# Patient Record
Sex: FEMALE | Race: WHITE | NOT HISPANIC OR LATINO | Employment: OTHER | URBAN - METROPOLITAN AREA
[De-identification: names, ages, dates, MRNs, and addresses within clinical notes are randomized per-mention and may not be internally consistent; named-entity substitution may affect disease eponyms.]

---

## 2018-10-24 ENCOUNTER — HOSPITAL ENCOUNTER (OUTPATIENT)
Dept: CARDIOLOGY | Facility: HOSPITAL | Age: 83
Discharge: HOME | End: 2018-10-24
Attending: INTERNAL MEDICINE | Admitting: INTERNAL MEDICINE

## 2019-10-24 RX ORDER — FUROSEMIDE 20 MG/1
TABLET ORAL
Qty: 130 TABLET | Refills: 2 | Status: SHIPPED | OUTPATIENT
Start: 2019-10-24 | End: 2020-09-10 | Stop reason: SDUPTHER

## 2019-12-18 RX ORDER — LOSARTAN POTASSIUM 100 MG/1
100 TABLET ORAL DAILY
Qty: 90 TABLET | Refills: 3 | Status: SHIPPED | OUTPATIENT
Start: 2019-12-18 | End: 2021-07-12

## 2019-12-18 RX ORDER — LOSARTAN POTASSIUM 100 MG/1
TABLET ORAL EVERY 24 HOURS
COMMUNITY
Start: 2018-10-18 | End: 2019-12-18 | Stop reason: SDUPTHER

## 2020-06-02 ENCOUNTER — OUTSIDE FACILITY SERVICE (OUTPATIENT)
Dept: CARDIOLOGY | Facility: CLINIC | Age: 85
End: 2020-06-02

## 2020-06-02 PROCEDURE — 93010 ELECTROCARDIOGRAM REPORT: CPT | Performed by: INTERNAL MEDICINE

## 2020-06-02 PROCEDURE — 99213 OFFICE O/P EST LOW 20 MIN: CPT | Performed by: INTERNAL MEDICINE

## 2020-09-10 RX ORDER — AMLODIPINE BESYLATE 5 MG/1
5 TABLET ORAL DAILY
COMMUNITY
Start: 2020-08-28 | End: 2021-07-12

## 2020-09-10 RX ORDER — PHENOL 1.4 %
600 AEROSOL, SPRAY (ML) MUCOUS MEMBRANE
COMMUNITY
Start: 2015-08-30

## 2020-09-10 RX ORDER — METOPROLOL TARTRATE 50 MG/1
75 TABLET, FILM COATED ORAL 2 TIMES DAILY
COMMUNITY
Start: 2020-06-17 | End: 2021-07-12

## 2020-09-10 RX ORDER — POTASSIUM CHLORIDE 750 MG/1
1 CAPSULE, EXTENDED RELEASE ORAL DAILY
COMMUNITY
Start: 2015-12-01 | End: 2021-07-27 | Stop reason: SDUPTHER

## 2020-09-10 RX ORDER — DILTIAZEM HYDROCHLORIDE 120 MG/1
120 CAPSULE, COATED, EXTENDED RELEASE ORAL DAILY
COMMUNITY
Start: 2020-07-06 | End: 2021-07-12

## 2020-09-10 RX ORDER — AMLODIPINE BESYLATE AND BENAZEPRIL HYDROCHLORIDE 5; 20 MG/1; MG/1
CAPSULE ORAL DAILY
COMMUNITY
Start: 2015-08-30 | End: 2020-09-10

## 2020-09-10 RX ORDER — ATORVASTATIN CALCIUM 10 MG/1
TABLET, FILM COATED ORAL
COMMUNITY
Start: 2015-12-01 | End: 2022-01-12 | Stop reason: SDUPTHER

## 2020-09-10 RX ORDER — METOLAZONE 2.5 MG/1
TABLET ORAL
COMMUNITY
Start: 2020-09-03 | End: 2021-07-12 | Stop reason: SDUPTHER

## 2020-09-10 RX ORDER — FUROSEMIDE 20 MG/1
TABLET ORAL
Qty: 130 TABLET | Refills: 0 | Status: SHIPPED | OUTPATIENT
Start: 2020-09-10 | End: 2021-10-07 | Stop reason: SDUPTHER

## 2020-09-10 NOTE — PROGRESS NOTES
Hoang Brother requesting refills on Lasix 20mg. RX sent and med list reconciled with fill history and [facility] med list from 6/2/2020

## 2021-07-12 ENCOUNTER — OFFICE VISIT (OUTPATIENT)
Dept: FAMILY MEDICINE CLINIC | Facility: CLINIC | Age: 86
End: 2021-07-12

## 2021-07-12 VITALS
OXYGEN SATURATION: 98 % | TEMPERATURE: 97.1 F | BODY MASS INDEX: 27.79 KG/M2 | WEIGHT: 151 LBS | SYSTOLIC BLOOD PRESSURE: 131 MMHG | DIASTOLIC BLOOD PRESSURE: 76 MMHG | HEIGHT: 62 IN | HEART RATE: 74 BPM

## 2021-07-12 DIAGNOSIS — I10 ESSENTIAL HYPERTENSION: Primary | ICD-10-CM

## 2021-07-12 DIAGNOSIS — E78.5 HYPERLIPIDEMIA, UNSPECIFIED HYPERLIPIDEMIA TYPE: ICD-10-CM

## 2021-07-12 DIAGNOSIS — D48.5 NEOPLASM OF UNCERTAIN BEHAVIOR OF SKIN: ICD-10-CM

## 2021-07-12 PROBLEM — I48.91 A-FIB (HCC): Status: ACTIVE | Noted: 2020-06-02

## 2021-07-12 PROBLEM — Z82.3 FAMILY HISTORY OF STROKE: Status: ACTIVE | Noted: 2021-07-12

## 2021-07-12 PROBLEM — Z83.3 FAMILY HISTORY OF DIABETES MELLITUS: Status: ACTIVE | Noted: 2021-07-12

## 2021-07-12 PROCEDURE — 99204 OFFICE O/P NEW MOD 45 MIN: CPT | Performed by: FAMILY MEDICINE

## 2021-07-12 RX ORDER — DILTIAZEM HYDROCHLORIDE 180 MG/1
180 CAPSULE, COATED, EXTENDED RELEASE ORAL DAILY
COMMUNITY
Start: 2021-05-28 | End: 2023-01-10 | Stop reason: SDUPTHER

## 2021-07-12 RX ORDER — METOLAZONE 2.5 MG/1
TABLET ORAL
Qty: 36 TABLET | Refills: 1 | Status: SHIPPED | OUTPATIENT
Start: 2021-07-12 | End: 2022-01-12 | Stop reason: SDUPTHER

## 2021-07-12 RX ORDER — LOSARTAN POTASSIUM 25 MG/1
25 TABLET ORAL DAILY
COMMUNITY
Start: 2021-05-28 | End: 2023-01-10 | Stop reason: SDUPTHER

## 2021-07-12 NOTE — PROGRESS NOTES
"Chief Complaint  Establish Care (using a new pharmacy as of today. Please send in new rx's for 90 days), Hypertension, and Hyperlipidemia    Subjective          Ro Mejia presents to [facility]  Hypertension  This is a chronic problem. The current episode started more than 1 year ago. The problem is unchanged. The problem is controlled. Pertinent negatives include no anxiety, chest pain, headaches, malaise/fatigue, palpitations, peripheral edema or shortness of breath. There are no associated agents to hypertension. Current antihypertension treatment includes calcium channel blockers, angiotensin blockers and diuretics. The current treatment provides moderate improvement. There are no compliance problems.    Hyperlipidemia  This is a chronic problem. The current episode started more than 1 year ago. The problem is controlled. There are no known factors aggravating her hyperlipidemia. Pertinent negatives include no chest pain or shortness of breath. Current antihyperlipidemic treatment includes statins and diet change. There are no compliance problems.        Objective   Vital Signs:   /76 (BP Location: Right arm, Patient Position: Sitting, Cuff Size: Adult)   Pulse 74   Temp 97.1 °F (36.2 °C)   Ht 156.8 cm (61.75\")   Wt 68.5 kg (151 lb)   SpO2 98%   BMI 27.84 kg/m²     Physical Exam  Constitutional:       General: She is not in acute distress.     Appearance: She is well-developed.   HENT:      Head: Normocephalic.   Eyes:      General: Lids are normal.      Conjunctiva/sclera: Conjunctivae normal.   Neck:      Thyroid: No thyroid mass or thyromegaly.      Trachea: Trachea normal.   Cardiovascular:      Rate and Rhythm: Normal rate and regular rhythm.      Heart sounds: Normal heart sounds.   Pulmonary:      Effort: Pulmonary effort is normal.      Breath sounds: Normal breath sounds.   Abdominal:      Palpations: Abdomen is soft.   Musculoskeletal:      Cervical " back: Normal range of motion.   Lymphadenopathy:      Cervical: No cervical adenopathy.   Skin:     General: Skin is warm and dry.   Neurological:      Mental Status: She is alert and oriented to person, place, and time.   Psychiatric:         Attention and Perception: She is attentive.         Mood and Affect: Mood normal.         Speech: Speech normal.         Behavior: Behavior normal.        Result Review :   The following data was reviewed by: Bianca Reid MD on 07/12/2021:                                Assessment and Plan    Diagnoses and all orders for this visit:    1. Essential hypertension (Primary)  -     metOLazone (ZAROXOLYN) 2.5 MG tablet; Take one on M, W, F  Dispense: 36 tablet; Refill: 1    2. Hyperlipidemia, unspecified hyperlipidemia type    3. Neoplasm of uncertain behavior of skin  -     Ambulatory Referral to Dermatology        Follow Up   Return in about 6 months (around 1/12/2022).  Patient was given instructions and counseling regarding her condition or for health maintenance advice. Please see specific information pulled into the AVS if appropriate.

## 2021-07-28 RX ORDER — POTASSIUM CHLORIDE 750 MG/1
10 CAPSULE, EXTENDED RELEASE ORAL DAILY
Qty: 90 CAPSULE | Refills: 1
Start: 2021-07-28 | End: 2021-10-07 | Stop reason: SDUPTHER

## 2021-09-27 ENCOUNTER — OFFICE VISIT (OUTPATIENT)
Dept: FAMILY MEDICINE CLINIC | Facility: CLINIC | Age: 86
End: 2021-09-27

## 2021-09-27 VITALS
TEMPERATURE: 96.9 F | DIASTOLIC BLOOD PRESSURE: 72 MMHG | OXYGEN SATURATION: 98 % | RESPIRATION RATE: 18 BRPM | HEIGHT: 62 IN | WEIGHT: 148 LBS | HEART RATE: 81 BPM | BODY MASS INDEX: 27.23 KG/M2 | SYSTOLIC BLOOD PRESSURE: 131 MMHG

## 2021-09-27 DIAGNOSIS — H61.21 IMPACTED CERUMEN OF RIGHT EAR: Primary | ICD-10-CM

## 2021-09-27 PROCEDURE — 99212 OFFICE O/P EST SF 10 MIN: CPT | Performed by: FAMILY MEDICINE

## 2021-09-27 PROCEDURE — 69209 REMOVE IMPACTED EAR WAX UNI: CPT | Performed by: FAMILY MEDICINE

## 2021-09-27 NOTE — PROGRESS NOTES
"Chief Complaint  Cerumen Impaction (R)    Subjective          Ro Mejia presents to [facility]  She went to have a hearing aid assessment and was told that she had a large amount of wax in her right ear.  Left ear is clear.         Objective   Vital Signs:   /72   Pulse 81   Temp 96.9 °F (36.1 °C)   Resp 18   Ht 156.8 cm (61.75\")   Wt 67.1 kg (148 lb)   SpO2 98%   BMI 27.29 kg/m²     Physical Exam  HENT:      Right Ear: There is impacted cerumen.        Result Review :          Ear Cerumen Removal    Date/Time: 9/27/2021 10:36 AM  Performed by: Bianca Reid MD  Authorized by: Bianca Reid MD     Anesthesia:  Local Anesthetic: none  Ceruminolytics applied: Ceruminolytics applied prior to the procedure.  Location details: right ear  Patient tolerance: patient tolerated the procedure well with no immediate complications  Procedure type: irrigation   Sedation:  Patient sedated: no              Assessment and Plan    Diagnoses and all orders for this visit:    1. Impacted cerumen of right ear (Primary)  -     Ear Cerumen Removal        Follow Up   No follow-ups on file.  Patient was given instructions and counseling regarding her condition or for health maintenance advice. Please see specific information pulled into the AVS if appropriate.       "

## 2021-10-07 NOTE — TELEPHONE ENCOUNTER
Caller: LESLIE DOUGLAS    Relationship: Emergency Contact      Medication requested (name and dosage):  furosemide (LASIX) 20 MG tablet  potassium chloride (MICRO-K) 10 MEQ CR capsule    Pharmacy where request should be sent: [facility] #[id number] [place], [place] - [address] [place] [address] RD AT SEC OF [place] [address] & [facility] [address] - [phone]  - [phone]  [phone]    Additional details provided by patient: PATIENT IS COMPLETELY OUT FUROSEMIDE AND WILL BE OUT OF POTASSIUM SOON AND ZERO REFILLS    Best call back number: [phone]    Does the patient have less than a 3 day supply:  [x] Yes  [] No    Neil Andrews Rep   10/07/21 13:48 EDT

## 2021-10-08 RX ORDER — POTASSIUM CHLORIDE 750 MG/1
10 CAPSULE, EXTENDED RELEASE ORAL DAILY
Qty: 90 CAPSULE | Refills: 1
Start: 2021-10-08 | End: 2021-10-11 | Stop reason: SDUPTHER

## 2021-10-08 RX ORDER — FUROSEMIDE 20 MG/1
TABLET ORAL
Qty: 130 TABLET | Refills: 0 | Status: SHIPPED | OUTPATIENT
Start: 2021-10-08 | End: 2022-01-12 | Stop reason: SDUPTHER

## 2021-10-11 RX ORDER — POTASSIUM CHLORIDE 750 MG/1
10 CAPSULE, EXTENDED RELEASE ORAL DAILY
Qty: 90 CAPSULE | Refills: 1
Start: 2021-10-11 | End: 2021-10-25 | Stop reason: SDUPTHER

## 2021-10-11 NOTE — TELEPHONE ENCOUNTER
Caller: LESLIE DOUGLAS    Relationship: Emergency Contact      Medication requested (name and dosage): potassium chloride (MICRO-K) 10 MEQ CR capsule    Pharmacy where request should be sent: [facility] #[id number] [place] [id number] [facility] AT [facility] OF [place] [address] & [facility] LINE  - [phone]  - [phone]      Additional details provided by patient: PATIENT IS OUT OF MEDICATION    Best call back number: [phone]  Does the patient have less than a 3 day supply:  [x] Yes  [] No    Neil Amaya Rep   10/11/21 11:14 EDT

## 2021-10-25 RX ORDER — POTASSIUM CHLORIDE 750 MG/1
10 CAPSULE, EXTENDED RELEASE ORAL DAILY
Qty: 90 CAPSULE | Refills: 1
Start: 2021-10-25 | End: 2021-10-28 | Stop reason: SDUPTHER

## 2021-10-25 NOTE — TELEPHONE ENCOUNTER
Caller: LESLIE DOUGLAS    Relationship: Emergency Contact      Medication requested (name and dosage):     Requested Prescriptions:   Requested Prescriptions     Pending Prescriptions Disp Refills   • potassium chloride (MICRO-K) 10 MEQ CR capsule 90 capsule 1     Sig: Take 1 capsule by mouth Daily.        Pharmacy where request should be sent: [facility] #[id number] [place], [place] - [address] AT SEC OF [place] [address] & [facility] LINE  - [phone]  - [phone]   [phone]    Additional details provided by patient: PATIENT IS OUT OF MEDICATION PHARMACY NEVER RECEIVED PRESCRIPTION    Best call back number: [phone]     Does the patient have less than a 3 day supply:  [x] Yes  [] No    Neil Michele Rep   10/25/21 11:08 EDT

## 2021-10-28 RX ORDER — POTASSIUM CHLORIDE 750 MG/1
10 CAPSULE, EXTENDED RELEASE ORAL DAILY
Qty: 90 CAPSULE | Refills: 1 | Status: SHIPPED | OUTPATIENT
Start: 2021-10-28 | End: 2022-01-12 | Stop reason: SDUPTHER

## 2021-10-28 NOTE — TELEPHONE ENCOUNTER
Caller: LESLIE DOUGLAS    Relationship: Emergency Contact    Medication requested (name and dosage):     Requested Prescriptions:   Requested Prescriptions     Pending Prescriptions Disp Refills   • potassium chloride (MICRO-K) 10 MEQ CR capsule 90 capsule 1     Sig: Take 1 capsule by mouth Daily.        Pharmacy where request should be sent: [facility] #[id number] [place], [place] - [address] [facility] AT [facility] OF [place] [address] & [facility] LINE  - [phone]  - [phone]   [phone]    Additional details provided by patient: PATIENT IS OUT AND HAS PHARMACY SAYS THEY HAVE NOT RECEIVED REQUEST    Best call back number: [phone]    Does the patient have less than a 3 day supply:  [x] Yes  [] No    Neil Sears Rep   10/28/21 11:32 EDT

## 2022-01-12 ENCOUNTER — OFFICE VISIT (OUTPATIENT)
Dept: FAMILY MEDICINE CLINIC | Facility: CLINIC | Age: 87
End: 2022-01-12

## 2022-01-12 VITALS
DIASTOLIC BLOOD PRESSURE: 76 MMHG | SYSTOLIC BLOOD PRESSURE: 118 MMHG | WEIGHT: 149 LBS | BODY MASS INDEX: 27.47 KG/M2 | TEMPERATURE: 97.7 F | OXYGEN SATURATION: 98 % | HEART RATE: 78 BPM

## 2022-01-12 DIAGNOSIS — I10 ESSENTIAL HYPERTENSION: Primary | ICD-10-CM

## 2022-01-12 DIAGNOSIS — K64.9 HEMORRHOIDS, UNSPECIFIED HEMORRHOID TYPE: ICD-10-CM

## 2022-01-12 DIAGNOSIS — E78.5 HYPERLIPIDEMIA, UNSPECIFIED HYPERLIPIDEMIA TYPE: ICD-10-CM

## 2022-01-12 PROCEDURE — 99214 OFFICE O/P EST MOD 30 MIN: CPT | Performed by: FAMILY MEDICINE

## 2022-01-12 RX ORDER — ATORVASTATIN CALCIUM 10 MG/1
10 TABLET, FILM COATED ORAL NIGHTLY
Qty: 90 TABLET | Refills: 1 | Status: SHIPPED | OUTPATIENT
Start: 2022-01-12 | End: 2022-07-08

## 2022-01-12 RX ORDER — DILTIAZEM HYDROCHLORIDE 180 MG/1
180 CAPSULE, EXTENDED RELEASE ORAL DAILY
COMMUNITY
Start: 2021-10-23 | End: 2023-01-10

## 2022-01-12 RX ORDER — HYDROCORTISONE ACETATE 25 MG/1
25 SUPPOSITORY RECTAL 2 TIMES DAILY
Qty: 10 SUPPOSITORY | Refills: 0 | Status: SHIPPED | OUTPATIENT
Start: 2022-01-12

## 2022-01-12 RX ORDER — METOLAZONE 2.5 MG/1
TABLET ORAL
Qty: 36 TABLET | Refills: 1 | Status: SHIPPED | OUTPATIENT
Start: 2022-01-12 | End: 2022-07-12 | Stop reason: SDUPTHER

## 2022-01-12 RX ORDER — POTASSIUM CHLORIDE 750 MG/1
10 CAPSULE, EXTENDED RELEASE ORAL DAILY
Qty: 90 CAPSULE | Refills: 1 | Status: SHIPPED | OUTPATIENT
Start: 2022-01-12 | End: 2022-07-12 | Stop reason: SDUPTHER

## 2022-01-12 RX ORDER — FUROSEMIDE 20 MG/1
TABLET ORAL
Qty: 48 TABLET | Refills: 1 | Status: SHIPPED | OUTPATIENT
Start: 2022-01-12 | End: 2023-01-10 | Stop reason: SDUPTHER

## 2022-01-12 NOTE — PROGRESS NOTES
Chief Complaint  Follow-up (6 month check up. non fasting )    Subjective          Ro Mejia presents to [facility]  Hemorrhoids  This is a new problem. The current episode started 1 to 4 weeks ago. The problem occurs constantly. The problem has been waxing and waning. Associated symptoms include myalgias. Pertinent negatives include no chest pain, chills, congestion, coughing, fever, swollen glands or weakness. Nothing aggravates the symptoms. She has tried nothing for the symptoms.   Hypertension  This is a chronic problem. The current episode started more than 1 year ago. The problem is unchanged. The problem is controlled. Pertinent negatives include no anxiety, chest pain, peripheral edema or shortness of breath. There are no associated agents to hypertension. Current antihypertension treatment includes angiotensin blockers and calcium channel blockers. The current treatment provides moderate improvement. There are no compliance problems.  There is no history of chronic renal disease.   Hyperlipidemia  This is a chronic problem. The current episode started more than 1 year ago. She has no history of chronic renal disease or hypothyroidism. There are no known factors aggravating her hyperlipidemia. Associated symptoms include myalgias. Pertinent negatives include no chest pain or shortness of breath. Current antihyperlipidemic treatment includes statins. The current treatment provides moderate improvement of lipids. There are no compliance problems.        Objective   Vital Signs:   /76 (BP Location: Left arm, Patient Position: Sitting, Cuff Size: Adult)   Pulse 78   Temp 97.7 °F (36.5 °C) (Infrared)   Wt 67.6 kg (149 lb)   SpO2 98%   BMI 27.47 kg/m²     Physical Exam  Vitals and nursing note reviewed. Exam conducted with a chaperone present.   Constitutional:       General: She is not in acute distress.     Appearance: She is well-developed.   HENT:      Head:  Normocephalic.   Eyes:      General: Lids are normal.      Conjunctiva/sclera: Conjunctivae normal.   Neck:      Thyroid: No thyroid mass or thyromegaly.      Trachea: Trachea normal.   Cardiovascular:      Rate and Rhythm: Normal rate and regular rhythm.      Heart sounds: Normal heart sounds.   Pulmonary:      Effort: Pulmonary effort is normal.      Breath sounds: Normal breath sounds.   Abdominal:      Palpations: Abdomen is soft.   Musculoskeletal:      Cervical back: Normal range of motion.   Lymphadenopathy:      Cervical: No cervical adenopathy.   Skin:     General: Skin is warm and dry.   Neurological:      Mental Status: She is alert and oriented to person, place, and time.   Psychiatric:         Attention and Perception: She is attentive.         Mood and Affect: Mood normal.         Speech: Speech normal.         Behavior: Behavior normal.        Result Review :   The following data was reviewed by: Bianca Reid MD on 01/12/2022:                Assessment and Plan    Diagnoses and all orders for this visit:    1. Essential hypertension (Primary)  -     metOLazone (ZAROXOLYN) 2.5 MG tablet; Take one on M, W, F  Dispense: 36 tablet; Refill: 1    2. Hyperlipidemia, unspecified hyperlipidemia type  -     Comprehensive Metabolic Panel  -     Lipid Panel    3. Hemorrhoids, unspecified hemorrhoid type  -     CBC & Differential    Other orders  -     hydrocortisone (ANUSOL-HC) 25 MG suppository; Insert 1 suppository into the rectum 2 (Two) Times a Day.  Dispense: 10 suppository; Refill: 0  -     rivaroxaban (XARELTO) 15 MG tablet; Take 1 tablet by mouth Daily With Dinner.  Dispense: 30 tablet; Refill: 5  -     potassium chloride (MICRO-K) 10 MEQ CR capsule; Take 1 capsule by mouth Daily.  Dispense: 90 capsule; Refill: 1  -     furosemide (LASIX) 20 MG tablet; 1 tablet 4 days a week  Dispense: 48 tablet; Refill: 1  -     atorvastatin (LIPITOR) 10 MG tablet; Take 1 tablet by mouth Every Night.  Dispense: 90  tablet; Refill: 1        Follow Up   No follow-ups on file.  Patient was given instructions and counseling regarding her condition or for health maintenance advice. Please see specific information pulled into the AVS if appropriate.

## 2022-06-22 ENCOUNTER — OFFICE VISIT (OUTPATIENT)
Dept: FAMILY MEDICINE CLINIC | Facility: CLINIC | Age: 87
End: 2022-06-22

## 2022-06-22 VITALS
WEIGHT: 146 LBS | BODY MASS INDEX: 26.7 KG/M2 | OXYGEN SATURATION: 96 % | SYSTOLIC BLOOD PRESSURE: 131 MMHG | HEART RATE: 81 BPM | DIASTOLIC BLOOD PRESSURE: 78 MMHG | TEMPERATURE: 98.4 F

## 2022-06-22 DIAGNOSIS — J40 BRONCHITIS: Primary | ICD-10-CM

## 2022-06-22 PROCEDURE — 99213 OFFICE O/P EST LOW 20 MIN: CPT | Performed by: FAMILY MEDICINE

## 2022-06-22 NOTE — ASSESSMENT & PLAN NOTE
Feeling much better.  She has finished her steroids and has a few days left of doxycycline.  Finish antibiotics as prescribed.  Call back if any symptoms return.

## 2022-06-22 NOTE — PROGRESS NOTES
"Chief Complaint  Follow-up (Bronchitis, pt stated she is feeling better she has a bad cough early in the mornings and at night other then that she feels much better )    Subjective        Ro Mejia presents to [facility]  Cough  This is a new problem. The current episode started in the past 7 days. The problem has been gradually improving. The cough is non-productive. Pertinent negatives include no chest pain, chills, ear congestion, ear pain, fever, headaches, nasal congestion or sore throat. The symptoms are aggravated by exercise. She has tried oral steroids, a beta-agonist inhaler and prescription cough suppressant (doxycycline) for the symptoms. The treatment provided moderate relief. There is no history of asthma or emphysema.       Objective   Vital Signs:  /78 (BP Location: Left arm, Patient Position: Sitting, Cuff Size: Adult)   Pulse 81   Temp 98.4 °F (36.9 °C) (Infrared)   Wt 66.2 kg (146 lb)   SpO2 96%   BMI 26.70 kg/m²   Estimated body mass index is 26.7 kg/m² as calculated from the following:    Height as of 6/16/22: 157.5 cm (62\").    Weight as of this encounter: 66.2 kg (146 lb).          Physical Exam  Vitals and nursing note reviewed.   Constitutional:       General: She is not in acute distress.     Appearance: She is well-developed.   HENT:      Head: Normocephalic.   Eyes:      General: Lids are normal.      Conjunctiva/sclera: Conjunctivae normal.   Neck:      Thyroid: No thyroid mass or thyromegaly.      Trachea: Trachea normal.   Cardiovascular:      Rate and Rhythm: Normal rate and regular rhythm.      Heart sounds: Normal heart sounds.   Pulmonary:      Effort: Pulmonary effort is normal.      Breath sounds: Normal breath sounds. No wheezing or rhonchi.   Musculoskeletal:      Cervical back: Normal range of motion.   Lymphadenopathy:      Cervical: No cervical adenopathy.   Skin:     General: Skin is warm and dry.   Neurological:      " Mental Status: She is alert and oriented to person, place, and time.   Psychiatric:         Attention and Perception: She is attentive.         Mood and Affect: Mood normal.         Speech: Speech normal.         Behavior: Behavior normal.        Result Review :  The following data was reviewed by: Bianca Reid MD on 06/22/2022:      Data reviewed: Radiologic studies chest x-ray from the [facility]          Assessment and Plan   Diagnoses and all orders for this visit:    1. Bronchitis (Primary)  Assessment & Plan:  Feeling much better.  She has finished her steroids and has a few days left of doxycycline.  Finish antibiotics as prescribed.  Call back if any symptoms return.             Follow Up   No follow-ups on file.  Patient was given instructions and counseling regarding her condition or for health maintenance advice. Please see specific information pulled into the AVS if appropriate.

## 2022-07-08 RX ORDER — ATORVASTATIN CALCIUM 10 MG/1
10 TABLET, FILM COATED ORAL NIGHTLY
Qty: 90 TABLET | Refills: 1 | Status: SHIPPED | OUTPATIENT
Start: 2022-07-08 | End: 2023-01-04

## 2022-07-08 RX ORDER — RIVAROXABAN 15 MG/1
TABLET, FILM COATED ORAL
Qty: 30 TABLET | Refills: 5 | Status: SHIPPED | OUTPATIENT
Start: 2022-07-08 | End: 2023-01-04

## 2022-07-12 ENCOUNTER — OFFICE VISIT (OUTPATIENT)
Dept: FAMILY MEDICINE CLINIC | Facility: CLINIC | Age: 87
End: 2022-07-12

## 2022-07-12 ENCOUNTER — LAB (OUTPATIENT)
Dept: FAMILY MEDICINE CLINIC | Facility: CLINIC | Age: 87
End: 2022-07-12

## 2022-07-12 VITALS
OXYGEN SATURATION: 97 % | HEIGHT: 62 IN | RESPIRATION RATE: 16 BRPM | WEIGHT: 147.4 LBS | SYSTOLIC BLOOD PRESSURE: 125 MMHG | TEMPERATURE: 97.8 F | BODY MASS INDEX: 27.12 KG/M2 | HEART RATE: 86 BPM | DIASTOLIC BLOOD PRESSURE: 73 MMHG

## 2022-07-12 DIAGNOSIS — I10 ESSENTIAL HYPERTENSION: ICD-10-CM

## 2022-07-12 DIAGNOSIS — E78.5 HYPERLIPIDEMIA, UNSPECIFIED HYPERLIPIDEMIA TYPE: ICD-10-CM

## 2022-07-12 DIAGNOSIS — Z00.00 MEDICARE ANNUAL WELLNESS VISIT, SUBSEQUENT: Primary | ICD-10-CM

## 2022-07-12 LAB
ALBUMIN SERPL-MCNC: 4.2 G/DL (ref 3.5–5.2)
ALBUMIN/GLOB SERPL: 1.9 G/DL
ALP SERPL-CCNC: 60 U/L (ref 39–117)
ALT SERPL W P-5'-P-CCNC: 12 U/L (ref 1–33)
ANION GAP SERPL CALCULATED.3IONS-SCNC: 11.5 MMOL/L (ref 5–15)
AST SERPL-CCNC: 18 U/L (ref 1–32)
BASOPHILS # BLD AUTO: 0.03 10*3/MM3 (ref 0–0.2)
BASOPHILS NFR BLD AUTO: 0.5 % (ref 0–1.5)
BILIRUB SERPL-MCNC: 0.5 MG/DL (ref 0–1.2)
BUN SERPL-MCNC: 22 MG/DL (ref 8–23)
BUN/CREAT SERPL: 16.1 (ref 7–25)
CALCIUM SPEC-SCNC: 9.6 MG/DL (ref 8.2–9.6)
CHLORIDE SERPL-SCNC: 102 MMOL/L (ref 98–107)
CHOLEST SERPL-MCNC: 132 MG/DL (ref 0–200)
CO2 SERPL-SCNC: 27.5 MMOL/L (ref 22–29)
CREAT SERPL-MCNC: 1.37 MG/DL (ref 0.57–1)
DEPRECATED RDW RBC AUTO: 42.1 FL (ref 37–54)
EGFRCR SERPLBLD CKD-EPI 2021: 36.5 ML/MIN/1.73
EOSINOPHIL # BLD AUTO: 0.08 10*3/MM3 (ref 0–0.4)
EOSINOPHIL NFR BLD AUTO: 1.3 % (ref 0.3–6.2)
ERYTHROCYTE [DISTWIDTH] IN BLOOD BY AUTOMATED COUNT: 12.9 % (ref 12.3–15.4)
GLOBULIN UR ELPH-MCNC: 2.2 GM/DL
GLUCOSE SERPL-MCNC: 93 MG/DL (ref 65–99)
HCT VFR BLD AUTO: 38.7 % (ref 34–46.6)
HDLC SERPL-MCNC: 68 MG/DL (ref 40–60)
HGB BLD-MCNC: 12.5 G/DL (ref 12–15.9)
IMM GRANULOCYTES # BLD AUTO: 0.02 10*3/MM3 (ref 0–0.05)
IMM GRANULOCYTES NFR BLD AUTO: 0.3 % (ref 0–0.5)
LDLC SERPL CALC-MCNC: 54 MG/DL (ref 0–100)
LDLC/HDLC SERPL: 0.81 {RATIO}
LYMPHOCYTES # BLD AUTO: 1.57 10*3/MM3 (ref 0.7–3.1)
LYMPHOCYTES NFR BLD AUTO: 26.1 % (ref 19.6–45.3)
MCH RBC QN AUTO: 29.7 PG (ref 26.6–33)
MCHC RBC AUTO-ENTMCNC: 32.3 G/DL (ref 31.5–35.7)
MCV RBC AUTO: 91.9 FL (ref 79–97)
MONOCYTES # BLD AUTO: 0.53 10*3/MM3 (ref 0.1–0.9)
MONOCYTES NFR BLD AUTO: 8.8 % (ref 5–12)
NEUTROPHILS NFR BLD AUTO: 3.79 10*3/MM3 (ref 1.7–7)
NEUTROPHILS NFR BLD AUTO: 63 % (ref 42.7–76)
NRBC BLD AUTO-RTO: 0 /100 WBC (ref 0–0.2)
PLATELET # BLD AUTO: 247 10*3/MM3 (ref 140–450)
PMV BLD AUTO: 11.4 FL (ref 6–12)
POTASSIUM SERPL-SCNC: 3.9 MMOL/L (ref 3.5–5.2)
PROT SERPL-MCNC: 6.4 G/DL (ref 6–8.5)
RBC # BLD AUTO: 4.21 10*6/MM3 (ref 3.77–5.28)
SODIUM SERPL-SCNC: 141 MMOL/L (ref 136–145)
TRIGL SERPL-MCNC: 43 MG/DL (ref 0–150)
VLDLC SERPL-MCNC: 10 MG/DL (ref 5–40)
WBC NRBC COR # BLD: 6.02 10*3/MM3 (ref 3.4–10.8)

## 2022-07-12 PROCEDURE — 36415 COLL VENOUS BLD VENIPUNCTURE: CPT | Performed by: FAMILY MEDICINE

## 2022-07-12 PROCEDURE — G0439 PPPS, SUBSEQ VISIT: HCPCS | Performed by: FAMILY MEDICINE

## 2022-07-12 PROCEDURE — 80053 COMPREHEN METABOLIC PANEL: CPT | Performed by: FAMILY MEDICINE

## 2022-07-12 PROCEDURE — 85025 COMPLETE CBC W/AUTO DIFF WBC: CPT | Performed by: FAMILY MEDICINE

## 2022-07-12 PROCEDURE — 80061 LIPID PANEL: CPT | Performed by: FAMILY MEDICINE

## 2022-07-12 PROCEDURE — 1159F MED LIST DOCD IN RCRD: CPT | Performed by: FAMILY MEDICINE

## 2022-07-12 RX ORDER — METOLAZONE 2.5 MG/1
TABLET ORAL
Qty: 36 TABLET | Refills: 3 | Status: SHIPPED | OUTPATIENT
Start: 2022-07-12 | End: 2023-01-10 | Stop reason: SDUPTHER

## 2022-07-12 RX ORDER — POTASSIUM CHLORIDE 750 MG/1
10 CAPSULE, EXTENDED RELEASE ORAL DAILY
Qty: 90 CAPSULE | Refills: 3 | Status: SHIPPED | OUTPATIENT
Start: 2022-07-12 | End: 2023-01-10 | Stop reason: SDUPTHER

## 2022-07-21 ENCOUNTER — TELEPHONE (OUTPATIENT)
Dept: FAMILY MEDICINE CLINIC | Facility: CLINIC | Age: 87
End: 2022-07-21

## 2022-07-21 NOTE — TELEPHONE ENCOUNTER
Please see the note on her labs.  It looks like Karen recorded that she gave the results to Phyllis on 7/13/22.  Please call her again with the test results.

## 2022-07-21 NOTE — TELEPHONE ENCOUNTER
No answer. Left a detailed vm with these results and asked cata to please return my call to confirm she received this information

## 2022-07-21 NOTE — TELEPHONE ENCOUNTER
Caller: DOUGLAS LESLIE    Relationship: Emergency Contact    Best call back number: [phone]    Caller requesting test results: PATIENTS DAUGHTER IN LAW    What test was performed: LABS    When was the test performed: 7-12-22    Where was the test performed: OFFICE    Additional notes: NEEDING LAB RESULTS, STATES THEY HAVE NEVER GOTTEN A CALL.

## 2023-01-04 RX ORDER — ATORVASTATIN CALCIUM 10 MG/1
10 TABLET, FILM COATED ORAL NIGHTLY
Qty: 90 TABLET | Refills: 1 | Status: SHIPPED | OUTPATIENT
Start: 2023-01-04 | End: 2023-01-10 | Stop reason: SDUPTHER

## 2023-01-04 RX ORDER — RIVAROXABAN 15 MG/1
TABLET, FILM COATED ORAL
Qty: 30 TABLET | Refills: 5 | Status: SHIPPED | OUTPATIENT
Start: 2023-01-04

## 2023-01-10 ENCOUNTER — OFFICE VISIT (OUTPATIENT)
Dept: FAMILY MEDICINE CLINIC | Facility: CLINIC | Age: 88
End: 2023-01-10
Payer: MEDICARE

## 2023-01-10 ENCOUNTER — LAB (OUTPATIENT)
Dept: FAMILY MEDICINE CLINIC | Facility: CLINIC | Age: 88
End: 2023-01-10
Payer: MEDICARE

## 2023-01-10 VITALS
OXYGEN SATURATION: 96 % | SYSTOLIC BLOOD PRESSURE: 139 MMHG | DIASTOLIC BLOOD PRESSURE: 83 MMHG | WEIGHT: 147 LBS | HEART RATE: 60 BPM | HEIGHT: 62 IN | BODY MASS INDEX: 27.05 KG/M2 | TEMPERATURE: 97.1 F

## 2023-01-10 DIAGNOSIS — I10 ESSENTIAL HYPERTENSION: Primary | ICD-10-CM

## 2023-01-10 DIAGNOSIS — H61.21 IMPACTED CERUMEN OF RIGHT EAR: ICD-10-CM

## 2023-01-10 DIAGNOSIS — E78.5 HYPERLIPIDEMIA, UNSPECIFIED HYPERLIPIDEMIA TYPE: ICD-10-CM

## 2023-01-10 LAB
ALBUMIN SERPL-MCNC: 4.4 G/DL (ref 3.5–5.2)
ALBUMIN/GLOB SERPL: 1.7 G/DL
ALP SERPL-CCNC: 82 U/L (ref 39–117)
ALT SERPL W P-5'-P-CCNC: 9 U/L (ref 1–33)
ANION GAP SERPL CALCULATED.3IONS-SCNC: 9 MMOL/L (ref 5–15)
AST SERPL-CCNC: 18 U/L (ref 1–32)
BILIRUB SERPL-MCNC: 0.7 MG/DL (ref 0–1.2)
BUN SERPL-MCNC: 21 MG/DL (ref 8–23)
BUN/CREAT SERPL: 15.1 (ref 7–25)
CALCIUM SPEC-SCNC: 10.1 MG/DL (ref 8.2–9.6)
CHLORIDE SERPL-SCNC: 99 MMOL/L (ref 98–107)
CHOLEST SERPL-MCNC: 135 MG/DL (ref 0–200)
CO2 SERPL-SCNC: 31 MMOL/L (ref 22–29)
CREAT SERPL-MCNC: 1.39 MG/DL (ref 0.57–1)
EGFRCR SERPLBLD CKD-EPI 2021: 35.7 ML/MIN/1.73
GLOBULIN UR ELPH-MCNC: 2.6 GM/DL
GLUCOSE SERPL-MCNC: 95 MG/DL (ref 65–99)
HDLC SERPL-MCNC: 65 MG/DL (ref 40–60)
LDLC SERPL CALC-MCNC: 59 MG/DL (ref 0–100)
LDLC/HDLC SERPL: 0.93 {RATIO}
POTASSIUM SERPL-SCNC: 3.9 MMOL/L (ref 3.5–5.2)
PROT SERPL-MCNC: 7 G/DL (ref 6–8.5)
SODIUM SERPL-SCNC: 139 MMOL/L (ref 136–145)
TRIGL SERPL-MCNC: 49 MG/DL (ref 0–150)
VLDLC SERPL-MCNC: 11 MG/DL (ref 5–40)

## 2023-01-10 PROCEDURE — 36415 COLL VENOUS BLD VENIPUNCTURE: CPT | Performed by: FAMILY MEDICINE

## 2023-01-10 PROCEDURE — 80053 COMPREHEN METABOLIC PANEL: CPT | Performed by: FAMILY MEDICINE

## 2023-01-10 PROCEDURE — 99214 OFFICE O/P EST MOD 30 MIN: CPT | Performed by: FAMILY MEDICINE

## 2023-01-10 PROCEDURE — 80061 LIPID PANEL: CPT | Performed by: FAMILY MEDICINE

## 2023-01-10 PROCEDURE — 69209 REMOVE IMPACTED EAR WAX UNI: CPT | Performed by: FAMILY MEDICINE

## 2023-01-10 RX ORDER — POTASSIUM CHLORIDE 750 MG/1
10 CAPSULE, EXTENDED RELEASE ORAL DAILY
Qty: 90 CAPSULE | Refills: 3 | Status: SHIPPED | OUTPATIENT
Start: 2023-01-10 | End: 2023-01-11

## 2023-01-10 RX ORDER — FUROSEMIDE 20 MG/1
TABLET ORAL
Qty: 48 TABLET | Refills: 3 | Status: SHIPPED | OUTPATIENT
Start: 2023-01-10 | End: 2023-04-03

## 2023-01-10 RX ORDER — ATORVASTATIN CALCIUM 10 MG/1
10 TABLET, FILM COATED ORAL NIGHTLY
Qty: 90 TABLET | Refills: 3 | Status: SHIPPED | OUTPATIENT
Start: 2023-01-10

## 2023-01-10 RX ORDER — LOSARTAN POTASSIUM 25 MG/1
25 TABLET ORAL DAILY
Qty: 90 TABLET | Refills: 3 | Status: SHIPPED | OUTPATIENT
Start: 2023-01-10

## 2023-01-10 RX ORDER — DILTIAZEM HYDROCHLORIDE 180 MG/1
180 CAPSULE, COATED, EXTENDED RELEASE ORAL DAILY
Qty: 90 CAPSULE | Refills: 3 | Status: SHIPPED | OUTPATIENT
Start: 2023-01-10

## 2023-01-10 RX ORDER — METOLAZONE 2.5 MG/1
TABLET ORAL
Qty: 36 TABLET | Refills: 3 | Status: SHIPPED | OUTPATIENT
Start: 2023-01-10

## 2023-01-10 NOTE — PROGRESS NOTES
"Chief Complaint  Hyperlipidemia (6 month f/u ) and Hypertension    Subjective        Ro Mejia presents to [facility]  Hyperlipidemia  This is a chronic problem. The current episode started more than 1 year ago. The problem is controlled. Recent lipid tests were reviewed and are normal. There are no known factors aggravating her hyperlipidemia. Pertinent negatives include no chest pain or shortness of breath. Current antihyperlipidemic treatment includes statins. The current treatment provides moderate improvement of lipids. There are no compliance problems.    Hypertension  This is a chronic problem. The current episode started more than 1 year ago. The problem is unchanged. The problem is controlled. Pertinent negatives include no blurred vision, chest pain, headaches, palpitations, peripheral edema or shortness of breath. There are no associated agents to hypertension. Current antihypertension treatment includes calcium channel blockers, diuretics and angiotensin blockers. The current treatment provides moderate improvement. There are no compliance problems.    Hearing Problem  This is a new problem. The problem occurs constantly. The problem has been unchanged. Pertinent negatives include no chest pain or headaches.       Objective   Vital Signs:  /83 (BP Location: Right arm)   Pulse 60   Temp 97.1 °F (36.2 °C) (Infrared)   Ht 157.5 cm (62\")   Wt 66.7 kg (147 lb)   SpO2 96%   BMI 26.89 kg/m²   Estimated body mass index is 26.89 kg/m² as calculated from the following:    Height as of this encounter: 157.5 cm (62\").    Weight as of this encounter: 66.7 kg (147 lb).             Physical Exam  Vitals and nursing note reviewed.   Constitutional:       General: She is not in acute distress.     Appearance: She is well-developed.   HENT:      Head: Normocephalic.      Right Ear: There is impacted cerumen.   Eyes:      General: Lids are normal.      Conjunctiva/sclera: " Conjunctivae normal.   Neck:      Thyroid: No thyroid mass or thyromegaly.      Trachea: Trachea normal.   Cardiovascular:      Rate and Rhythm: Normal rate. Rhythm irregularly irregular.      Heart sounds: Normal heart sounds.   Pulmonary:      Effort: Pulmonary effort is normal.      Breath sounds: Normal breath sounds.   Abdominal:      Palpations: Abdomen is soft.   Musculoskeletal:      Cervical back: Normal range of motion.   Lymphadenopathy:      Cervical: No cervical adenopathy.   Skin:     General: Skin is warm and dry.   Neurological:      Mental Status: She is alert and oriented to person, place, and time.   Psychiatric:         Attention and Perception: She is attentive.         Mood and Affect: Mood normal.         Speech: Speech normal.         Behavior: Behavior normal.        Result Review :  The following data was reviewed by: Bianca Reid MD on 01/10/2023:  Common labs    Common Labs 7/12/22 7/12/22 7/12/22 1/10/23 1/10/23    0907 [id number]   Glucose  93  95    BUN  22  21    Creatinine  1.37 (A)  1.39 (A)    Sodium  141  139    Potassium  3.9  3.9    Chloride  102  99    Calcium  9.6  10.1 (A)    Albumin  4.20  4.4    Total Bilirubin  0.5  0.7    Alkaline Phosphatase  60  82    AST (SGOT)  18  18    ALT (SGPT)  12  9    WBC 6.02       Hemoglobin 12.5       Hematocrit 38.7       Platelets 247       Total Cholesterol   132  135   Triglycerides   43  49   HDL Cholesterol   68 (A)  65 (A)   LDL Cholesterol    54  59   (A) Abnormal value                  Ear Cerumen Removal    Date/Time: 1/10/2023 3:08 PM  Performed by: Bianca Reid MD  Authorized by: Bianca Reid MD     Anesthesia:  Local Anesthetic: none  Location details: right ear  Patient tolerance: patient tolerated the procedure well with no immediate complications  Procedure type: irrigation           Assessment and Plan   Diagnoses and all orders for this visit:    1. Essential hypertension (Primary)  Assessment &  Plan:  Hypertension is unchanged.  Continue current treatment regimen.  Blood pressure will be reassessed at the next regular appointment.    Orders:  -     losartan (COZAAR) 25 MG tablet; Take 1 tablet by mouth Daily.  Dispense: 90 tablet; Refill: 3  -     dilTIAZem CD (CARDIZEM CD) 180 MG 24 hr capsule; Take 1 capsule by mouth Daily.  Dispense: 90 capsule; Refill: 3  -     metOLazone (ZAROXOLYN) 2.5 MG tablet; Take one on M, W, F  Dispense: 36 tablet; Refill: 3  -     Lipid Panel  -     Comprehensive Metabolic Panel    2. Hyperlipidemia, unspecified hyperlipidemia type  Assessment & Plan:  Lipid abnormalities are improving with treatment.  Pharmacotherapy as ordered.  Lipids will be reassessed in 6 months.    Orders:  -     Lipid Panel  -     Comprehensive Metabolic Panel    3. Impacted cerumen of right ear  -     Cerumen Removal    Other orders  -     atorvastatin (LIPITOR) 10 MG tablet; Take 1 tablet by mouth Every Night.  Dispense: 90 tablet; Refill: 3  -     Discontinue: potassium chloride (MICRO-K) 10 MEQ CR capsule; Take 1 capsule by mouth Daily.  Dispense: 90 capsule; Refill: 3  -     furosemide (LASIX) 20 MG tablet; 1 tablet 4 days a week  Dispense: 48 tablet; Refill: 3           Follow Up   Return in about 6 months (around 7/10/2023).  Patient was given instructions and counseling regarding her condition or for health maintenance advice. Please see specific information pulled into the AVS if appropriate.       Answers for HPI/ROS submitted by the patient on 1/3/2023  Please describe your symptoms.: Routine check up. Having more shortness of breath than previously.  Have you had these symptoms before?: Yes  How long have you been having these symptoms?: Greater than 2 weeks  Please list any medications you are currently taking for this condition.: No changes in medicine since last visit.  Please describe any probable cause for these symptoms. : more prevalent since I  had bronchitis.  What is the primary  reason for your visit?: Other

## 2023-01-11 RX ORDER — POTASSIUM CHLORIDE 750 MG/1
10 CAPSULE, EXTENDED RELEASE ORAL DAILY
Qty: 90 CAPSULE | Refills: 3 | Status: SHIPPED | OUTPATIENT
Start: 2023-01-11

## 2023-01-26 ENCOUNTER — TELEPHONE (OUTPATIENT)
Dept: FAMILY MEDICINE CLINIC | Facility: CLINIC | Age: 88
End: 2023-01-26

## 2023-01-26 NOTE — TELEPHONE ENCOUNTER
Caller: LESLIE DOUGLAS    Relationship: Emergency Contact    Best call back number:     Caller requesting test results:     What test was performed: LAB    When was the test performed: 01/10/23    Where was the test performed: [facility] OFF[facility]    Additional notes: LESLIE IS CALLING IN TO SEE IF THE OFFICE HAS THE PATIENTS LAB RESULTS IN YET.

## 2023-01-27 NOTE — TELEPHONE ENCOUNTER
It looks like we already left a voicemail about this but maybe she did not get it.  Her labs are all stable.

## 2023-01-30 NOTE — TELEPHONE ENCOUNTER
Phyllis her daughter in law received this information and will relay this to Ro and she will call if she has any questions

## 2023-04-03 RX ORDER — FUROSEMIDE 20 MG/1
TABLET ORAL
Qty: 48 TABLET | Refills: 3 | Status: SHIPPED | OUTPATIENT
Start: 2023-04-03

## 2023-06-19 DIAGNOSIS — I10 ESSENTIAL HYPERTENSION: ICD-10-CM

## 2023-06-19 RX ORDER — METOLAZONE 2.5 MG/1
TABLET ORAL
Qty: 36 TABLET | Refills: 3 | Status: SHIPPED | OUTPATIENT
Start: 2023-06-19

## 2023-06-19 NOTE — TELEPHONE ENCOUNTER
Caller: LESLIE DOUGLAS    Relationship: Emergency Contact    Best call back number: [phone]     Requested Prescriptions:   Requested Prescriptions     Pending Prescriptions Disp Refills    metOLazone (ZAROXOLYN) 2.5 MG tablet 36 tablet 3     Sig: Take one on M, W, F        Pharmacy where request should be sent: [facility] #[id number] [address] AT [place] [address] & [facility] - [phone] [facility] [phone]      Last office visit with prescribing clinician: 1/10/2023   Last telemedicine visit with prescribing clinician: Visit date not found   Next office visit with prescribing clinician: 7/11/2023     Additional details provided by patient: LESLIE (ON [facility]) STATES THAT PATIENT WILL NEED A PARTIAL REFILL FOR 4 PILLS OF THIS MEDICATION TO LAST HER UNTIL HER UPCOMING APPOINTMENT.    Does the patient have less than a 3 day supply:  [] Yes  [x] No    Would you like a call back once the refill request has been completed: [] Yes [] No    If the office needs to give you a call back, can they leave a voicemail: [] Yes [] No    PLEASE ADVISE.    Neil Perkins Rep   06/19/23 11:44 EDT

## 2023-07-11 ENCOUNTER — OFFICE VISIT (OUTPATIENT)
Dept: FAMILY MEDICINE CLINIC | Facility: CLINIC | Age: 88
End: 2023-07-11
Payer: MEDICARE

## 2023-07-11 VITALS
TEMPERATURE: 98.4 F | DIASTOLIC BLOOD PRESSURE: 79 MMHG | WEIGHT: 146 LBS | BODY MASS INDEX: 26.87 KG/M2 | OXYGEN SATURATION: 98 % | SYSTOLIC BLOOD PRESSURE: 138 MMHG | HEIGHT: 62 IN | HEART RATE: 73 BPM

## 2023-07-11 DIAGNOSIS — I10 ESSENTIAL HYPERTENSION: ICD-10-CM

## 2023-07-11 DIAGNOSIS — I48.11 LONGSTANDING PERSISTENT ATRIAL FIBRILLATION: ICD-10-CM

## 2023-07-11 DIAGNOSIS — E78.5 HYPERLIPIDEMIA, UNSPECIFIED HYPERLIPIDEMIA TYPE: ICD-10-CM

## 2023-07-11 DIAGNOSIS — M25.572 ACUTE LEFT ANKLE PAIN: Primary | ICD-10-CM

## 2023-07-11 LAB
ALBUMIN SERPL-MCNC: 4.3 G/DL (ref 3.5–5.2)
ALBUMIN/GLOB SERPL: 1.7 G/DL
ALP SERPL-CCNC: 70 U/L (ref 39–117)
ALT SERPL W P-5'-P-CCNC: 9 U/L (ref 1–33)
ANION GAP SERPL CALCULATED.3IONS-SCNC: 11.9 MMOL/L (ref 5–15)
AST SERPL-CCNC: 17 U/L (ref 1–32)
BASOPHILS # BLD AUTO: 0.03 10*3/MM3 (ref 0–0.2)
BASOPHILS NFR BLD AUTO: 0.3 % (ref 0–1.5)
BILIRUB SERPL-MCNC: 1 MG/DL (ref 0–1.2)
BUN SERPL-MCNC: 19 MG/DL (ref 8–23)
BUN/CREAT SERPL: 14.7 (ref 7–25)
CALCIUM SPEC-SCNC: 10.1 MG/DL (ref 8.2–9.6)
CHLORIDE SERPL-SCNC: 98 MMOL/L (ref 98–107)
CHOLEST SERPL-MCNC: 130 MG/DL (ref 0–200)
CO2 SERPL-SCNC: 29.1 MMOL/L (ref 22–29)
CREAT SERPL-MCNC: 1.29 MG/DL (ref 0.57–1)
DEPRECATED RDW RBC AUTO: 38.7 FL (ref 37–54)
EGFRCR SERPLBLD CKD-EPI 2021: 39 ML/MIN/1.73
EOSINOPHIL # BLD AUTO: 0.02 10*3/MM3 (ref 0–0.4)
EOSINOPHIL NFR BLD AUTO: 0.2 % (ref 0.3–6.2)
ERYTHROCYTE [DISTWIDTH] IN BLOOD BY AUTOMATED COUNT: 12 % (ref 12.3–15.4)
GLOBULIN UR ELPH-MCNC: 2.5 GM/DL
GLUCOSE SERPL-MCNC: 104 MG/DL (ref 65–99)
HCT VFR BLD AUTO: 38.7 % (ref 34–46.6)
HDLC SERPL-MCNC: 68 MG/DL (ref 40–60)
HGB BLD-MCNC: 12.9 G/DL (ref 12–15.9)
IMM GRANULOCYTES # BLD AUTO: 0.03 10*3/MM3 (ref 0–0.05)
IMM GRANULOCYTES NFR BLD AUTO: 0.3 % (ref 0–0.5)
LDLC SERPL CALC-MCNC: 50 MG/DL (ref 0–100)
LDLC/HDLC SERPL: 0.76 {RATIO}
LYMPHOCYTES # BLD AUTO: 1.56 10*3/MM3 (ref 0.7–3.1)
LYMPHOCYTES NFR BLD AUTO: 17.9 % (ref 19.6–45.3)
MCH RBC QN AUTO: 29.5 PG (ref 26.6–33)
MCHC RBC AUTO-ENTMCNC: 33.3 G/DL (ref 31.5–35.7)
MCV RBC AUTO: 88.6 FL (ref 79–97)
MONOCYTES # BLD AUTO: 0.92 10*3/MM3 (ref 0.1–0.9)
MONOCYTES NFR BLD AUTO: 10.6 % (ref 5–12)
NEUTROPHILS NFR BLD AUTO: 6.16 10*3/MM3 (ref 1.7–7)
NEUTROPHILS NFR BLD AUTO: 70.7 % (ref 42.7–76)
NRBC BLD AUTO-RTO: 0.1 /100 WBC (ref 0–0.2)
PLATELET # BLD AUTO: 238 10*3/MM3 (ref 140–450)
PMV BLD AUTO: 11.1 FL (ref 6–12)
POTASSIUM SERPL-SCNC: 3.2 MMOL/L (ref 3.5–5.2)
PROT SERPL-MCNC: 6.8 G/DL (ref 6–8.5)
RBC # BLD AUTO: 4.37 10*6/MM3 (ref 3.77–5.28)
SODIUM SERPL-SCNC: 139 MMOL/L (ref 136–145)
TRIGL SERPL-MCNC: 51 MG/DL (ref 0–150)
VLDLC SERPL-MCNC: 12 MG/DL (ref 5–40)
WBC NRBC COR # BLD: 8.72 10*3/MM3 (ref 3.4–10.8)

## 2023-07-11 PROCEDURE — 99214 OFFICE O/P EST MOD 30 MIN: CPT | Performed by: FAMILY MEDICINE

## 2023-07-11 PROCEDURE — 1159F MED LIST DOCD IN RCRD: CPT | Performed by: FAMILY MEDICINE

## 2023-07-11 PROCEDURE — 1160F RVW MEDS BY RX/DR IN RCRD: CPT | Performed by: FAMILY MEDICINE

## 2023-07-11 PROCEDURE — 36415 COLL VENOUS BLD VENIPUNCTURE: CPT | Performed by: FAMILY MEDICINE

## 2023-07-11 RX ORDER — POTASSIUM CHLORIDE 750 MG/1
10 CAPSULE, EXTENDED RELEASE ORAL DAILY
Qty: 90 CAPSULE | Refills: 3 | Status: SHIPPED | OUTPATIENT
Start: 2023-07-11

## 2023-07-11 RX ORDER — DILTIAZEM HYDROCHLORIDE 180 MG/1
180 CAPSULE, EXTENDED RELEASE ORAL DAILY
COMMUNITY
Start: 2023-04-06

## 2023-07-11 RX ORDER — LOSARTAN POTASSIUM 25 MG/1
25 TABLET ORAL DAILY
Qty: 90 TABLET | Refills: 3 | Status: SHIPPED | OUTPATIENT
Start: 2023-07-11

## 2023-07-11 RX ORDER — MULTIPLE VITAMINS W/ MINERALS TAB 9MG-400MCG
1 TAB ORAL DAILY
COMMUNITY

## 2023-07-13 ENCOUNTER — TELEPHONE (OUTPATIENT)
Dept: FAMILY MEDICINE CLINIC | Facility: CLINIC | Age: 88
End: 2023-07-13

## 2023-07-13 RX ORDER — CALCIUM CARBONATE 160(400)MG
1 TABLET,CHEWABLE ORAL DAILY
Qty: 1 EACH | Refills: 0 | Status: SHIPPED | OUTPATIENT
Start: 2023-07-13

## 2023-08-07 RX ORDER — RIVAROXABAN 15 MG/1
TABLET, FILM COATED ORAL
Qty: 30 TABLET | Refills: 5 | Status: SHIPPED | OUTPATIENT
Start: 2023-08-07

## 2023-08-23 ENCOUNTER — TELEPHONE (OUTPATIENT)
Dept: FAMILY MEDICINE CLINIC | Facility: CLINIC | Age: 88
End: 2023-08-23
Payer: MEDICARE

## 2023-08-23 RX ORDER — POTASSIUM CHLORIDE 750 MG/1
10 CAPSULE, EXTENDED RELEASE ORAL 2 TIMES DAILY
Qty: 180 CAPSULE | Refills: 3 | Status: SHIPPED | OUTPATIENT
Start: 2023-08-23

## 2023-08-23 NOTE — TELEPHONE ENCOUNTER
Caller: LESLIE DOULGAS    Relationship: Emergency Contact    Best call back number: [phone]    What medication are you requesting: POTASSIUM 10 MEQ 2 DAILY     What are your current symptoms:     How long have you been experiencing symptoms:     Have you had these symptoms before:    [x] Yes  [] No    Have you been treated for these symptoms before:   [x] Yes  [] No    If a prescription is needed, what is your preferred pharmacy and phone number: [id number] [place] [address] [address] AT SEC OF [facility] [address] & [facility] LINE  - [phone]  - [phone]      Additional notes: DR MARSH INCREASED THE DOSAGE AT LAST APPT

## 2023-09-12 NOTE — TELEPHONE ENCOUNTER
Caller: LESLIE DOUGLAS    Relationship: Emergency Contact    Best call back number: [phone] (M)    Medication needed:   Requested Prescriptions     Pending Prescriptions Disp Refills   • potassium chloride (MICRO-K) 10 MEQ CR capsule       Sig: Daily.       When do you need the refill by: 07/27/21    What additional details did the patient provide when requesting the medication:     Does the patient have less than a 3 day supply:  [x] Yes  [] No    What is the patient's preferred pharmacy: [facility] #[id number] [address] AT SEC OF [place] [address] & [facility] LINE  - [phone]  - [phone] FX              No

## 2023-10-09 ENCOUNTER — OFFICE VISIT (OUTPATIENT)
Dept: FAMILY MEDICINE CLINIC | Facility: CLINIC | Age: 88
End: 2023-10-09
Payer: MEDICARE

## 2023-10-09 VITALS
TEMPERATURE: 97.2 F | BODY MASS INDEX: 26.87 KG/M2 | OXYGEN SATURATION: 98 % | SYSTOLIC BLOOD PRESSURE: 110 MMHG | WEIGHT: 146 LBS | DIASTOLIC BLOOD PRESSURE: 70 MMHG | RESPIRATION RATE: 16 BRPM | HEIGHT: 62 IN | HEART RATE: 77 BPM

## 2023-10-09 DIAGNOSIS — M50.30 DDD (DEGENERATIVE DISC DISEASE), CERVICAL: ICD-10-CM

## 2023-10-09 DIAGNOSIS — M54.2 NECK PAIN ON RIGHT SIDE: Primary | ICD-10-CM

## 2023-10-09 PROCEDURE — 1160F RVW MEDS BY RX/DR IN RCRD: CPT | Performed by: FAMILY MEDICINE

## 2023-10-09 PROCEDURE — 1159F MED LIST DOCD IN RCRD: CPT | Performed by: FAMILY MEDICINE

## 2023-10-09 PROCEDURE — 99213 OFFICE O/P EST LOW 20 MIN: CPT | Performed by: FAMILY MEDICINE

## 2023-10-09 RX ORDER — BACLOFEN 10 MG/1
10 TABLET ORAL NIGHTLY PRN
Qty: 10 TABLET | Refills: 0 | Status: SHIPPED | OUTPATIENT
Start: 2023-10-09

## 2023-10-09 RX ORDER — PREDNISONE 10 MG/1
10 TABLET ORAL DAILY
Qty: 5 TABLET | Refills: 0 | Status: SHIPPED | OUTPATIENT
Start: 2023-10-09 | End: 2023-10-14

## 2023-10-09 NOTE — PROGRESS NOTES
Subjective   Ro Mejia is a 89 y.o. female.     History of Present Illness    Patient present with complaint of right-sided neck pain. The incident occurred more than 4-6 week ago. The quality of the pain is described as aching and shooting. The pain is at a severity of  5/10. The pain is moderate.  Description the symptoms are aggravated by movement. She has tried acetaminophen for the symptoms. The treatment provided no relief.            The following portions of the patient's history were reviewed and updated as appropriate: past medical history, past social history, past surgical history and problem list.    Review of Systems   Musculoskeletal:  Positive for arthralgias and neck pain.   Neurological:  Negative for headache.       Objective   Physical Exam  Vitals reviewed.   Musculoskeletal:      Cervical back: Pain with movement and muscular tenderness present. Decreased range of motion.   Neurological:      Mental Status: She is oriented to person, place, and time.       Vitals:    10/09/23 1450   BP: 110/70   Pulse: 77   Resp: 16   Temp: 97.2 °F (36.2 °C)   SpO2: 98%     Current Outpatient Medications on File Prior to Visit   Medication Sig Dispense Refill    albuterol sulfate  (90 Base) MCG/ACT inhaler Inhale 2 puffs Every 4 (Four) Hours As Needed for Wheezing. 8 g 0    atorvastatin (LIPITOR) 10 MG tablet Take 1 tablet by mouth Every Night. 90 tablet 3    calcium carbonate (OS-CHHAYA) 600 MG tablet Take 1 tablet by mouth.      Cyanocobalamin (VITAMIN B-12 PO)   Maintenance, Refills: 0, Total Refills: 0, 07/08/18 18:10:20 EDT      dilTIAZem CD (CARDIZEM CD) 180 MG 24 hr capsule Take 1 capsule by mouth Daily. 90 capsule 3    dilTIAZem XR (DILACOR XR) 180 MG 24 hr capsule Take 1 capsule by mouth Daily.      furosemide (LASIX) 20 MG tablet TAKE 1 TABLET BY MOUTH 4 DAYS A WEEK 48 tablet 3    hydrocortisone (ANUSOL-HC) 25 MG suppository Insert 1 suppository into the rectum 2 (Two) Times a Day. 10  suppository 0    losartan (COZAAR) 25 MG tablet Take 1 tablet by mouth Daily. 90 tablet 3    metOLazone (ZAROXOLYN) 2.5 MG tablet Take one on M, W, F 36 tablet 3    Misc. Devices (Rollator Ultra-Light) misc 1 Device Daily. 1 each 0    multivitamin with minerals (VISION-JOSÉ PRESERVE PO) Take 1 tablet by mouth Daily.      potassium chloride (MICRO-K) 10 MEQ CR capsule Take 1 capsule by mouth 2 (Two) Times a Day. 180 capsule 3    Pyridoxine HCl (VITAMIN B-6 PO)   Maintenance, Refills: 0, Total Refills: 0, 07/08/18 18:10:37 EDT      rivaroxaban (Xarelto) 15 MG tablet TAKE 1 TABLET BY MOUTH DAILY WITH DINNER 30 tablet 5     No current facility-administered medications on file prior to visit.           Assessment & Plan   Problems Addressed this Visit          Musculoskeletal and Injuries    Neck pain on right side - Primary     Discussed intermittent application of heat,  analgesics and muscle relaxants are recommended. Call or return to clinic prn if these symptoms worsen or fail to improve as anticipated.          Relevant Orders    Ambulatory Referral to Physical Therapy       Neuro    DDD (degenerative disc disease), cervical    Relevant Orders    Ambulatory Referral to Physical Therapy     Diagnoses         Codes Comments    Neck pain on right side    -  Primary ICD-10-CM: M54.2  ICD-9-CM: 723.1     DDD (degenerative disc disease), cervical     ICD-10-CM: M50.30  ICD-9-CM: 722.4

## 2023-10-17 ENCOUNTER — TREATMENT (OUTPATIENT)
Dept: PHYSICAL THERAPY | Facility: CLINIC | Age: 88
End: 2023-10-17
Payer: MEDICARE

## 2023-10-17 DIAGNOSIS — M50.30 DDD (DEGENERATIVE DISC DISEASE), CERVICAL: ICD-10-CM

## 2023-10-17 DIAGNOSIS — M54.2 NECK PAIN ON RIGHT SIDE: Primary | ICD-10-CM

## 2023-10-17 PROCEDURE — 97140 MANUAL THERAPY 1/> REGIONS: CPT | Performed by: PHYSICAL THERAPIST

## 2023-10-17 PROCEDURE — 97162 PT EVAL MOD COMPLEX 30 MIN: CPT | Performed by: PHYSICAL THERAPIST

## 2023-10-17 PROCEDURE — 97112 NEUROMUSCULAR REEDUCATION: CPT | Performed by: PHYSICAL THERAPIST

## 2023-10-17 NOTE — PROGRESS NOTES
Physical Therapy Initial Evaluation and Plan of Care  Office: [address] [facility] [address], [place] [address]  P: [phone]  F: [phone]    Patient: oR Mejia   : 1/3/1931  Diagnosis/ICD-10 Code:  Neck pain on right side [M54.2]  Referring practitioner: Alessandra Miranda MD  Date of Initial Visit: 10/17/2023  Today's Date: 10/17/2023  Patient seen for 1 sessions           Subjective Questionnaire: NDI: 20% impairment       Subjective Evaluation    History of Present Illness  Mechanism of injury: Pt reports that she is having some pain on the R side of neck that starts at base and radiates up. Sometimes even radiates to other side. Pain began 2 months ago. No specific SHOSHANA, just woke up with it one day. Pt states that she fell in Feb and they took CT of neck which showed arthritis. No numbness or tingling. No falls last 6 months but did fall again after the one in Feb and sprained her ankle. No headaches or dizziness. No vision changes since this started. Hurts when looking down or turning her head too far to the right. Has to bring her book up in front of her when she's reading. Has been using heating pad which helps. MD prescribed some medication (thinks steroid pack) but didn't help any. Not waking up from neck pain. MD follow-up in Dec.     Pain  Current pain rating: 3  At best pain rating: 3  At worst pain rating: 3  Quality: dull ache and sharp  Relieving factors: heat and rest  Aggravating factors: movement and prolonged positioning    Hand dominance: right    Diagnostic Tests  Abnormal CT scan: see imaging.    Patient Goals  Patient goals for therapy: decreased pain, increased motion, return to sport/leisure activities, increased strength and independence with ADLs/IADLs  Patient goal: be able read without having to hold the book up in front of her all the time       Past Medical History:   Diagnosis Date    Atrial fibrillation     Coronary artery disease     HL (hearing loss)     Hyperlipidemia      Hypertension     Obesity     Visual impairment     Sees Dr Caldwell for routine and macular degeneration        Past Surgical History:   Procedure Laterality Date    CARPAL TUNNEL RELEASE      CHOLECYSTECTOMY      TRIGGER FINGER RELEASE      TUBAL ABDOMINAL LIGATION          Objective          Static Posture     Head  Forward.    Shoulders  Depressed and rounded.    Thoracic Spine  Hyperkyphosis.    Palpation     Right   Hypertonic in the suboccipitals and upper trapezius. Tenderness of the cervical paraspinals, levator scapulae, suboccipitals and upper trapezius.     Tenderness   Cervical Spine   Tenderness in the facet joint.     Neurological Testing     Sensation   Cervical/Thoracic   Left   Intact: light touch    Right   Intact: light touch    Active Range of Motion   Cervical/Thoracic Spine   Cervical    Flexion: 48 degrees with pain  Extension: 36 degrees with pain  Left lateral flexion: 38 degrees   Right lateral flexion: 20 degrees with pain  Left rotation: 63 degrees   Right rotation: 42 degrees with pain    Additional Active Range of Motion Details  Pulling on the R side of cervical spine with L sidebending and rotation     Tests   Cervical     Right   Positive Spurling's sign.   Negative alar ligament integrity and transverse ligament.           Assessment & Plan       Assessment  Impairments: abnormal coordination, abnormal muscle firing, abnormal muscle tone, abnormal or restricted ROM, activity intolerance, impaired physical strength, lacks appropriate home exercise program, pain with function and weight-bearing intolerance   Functional limitations: carrying objects, lifting, sleeping, pulling, pushing, uncomfortable because of pain and reaching overhead   Assessment details: Pt is a 89 year old with c/o R sided cervical spine pain. Pt demonstrates general decreased mobility on the R side facets as well as increased muscle guarding in the R side cervical spine musculature. Increased thoracic kyphosis  leading to increased cervical lordosis noted as well. Poor activation of DNF and scap stabilizers. Rounded shoulders noted. Limited cervical spine ROM due to pain and decreased mobility of joints and muscles.     Functional limitations are listed above. Pt will benefit from PT in order to address impairments and improve overall function.     Prognosis: good    Goals  Plan Goals: STG (3 weeks):  Pt will demonstrate increased activation of scap stabilizers and DNF during activities/exercises to decrease load on cervical spine.   Pt will display improved ROM of cervical spine to WFL with min to no pain to assist with functional tasks.     LTG (6 weeks):  Pt will be independent with home exercises to assist with improved strength and continue to improve function.   Pt will demonstrate decreased score on the NDI to 10% to demonstrate decreased overall impairment.   Pt will be able to perform housework and ADL's as well as reading with min to no increased tightness or pain in the cervical spine for improved function.   Pt will demonstrate improved cervical spine and scapular strength to 4-4+/5 overall to assist with function.      Plan  Therapy options: will be seen for skilled therapy services  Planned modality interventions: cryotherapy, TENS, thermotherapy (hydrocollator packs) and traction  Planned therapy interventions: ADL retraining, body mechanics training, fine motor coordination training, flexibility, functional ROM exercises, home exercise program, joint mobilization, manual therapy, motor coordination training, neuromuscular re-education, postural training, soft tissue mobilization, spinal/joint mobilization, strengthening, stretching and therapeutic activities  Frequency: 2x week  Duration in weeks: 12  Treatment plan discussed with: patient      HEP:   Access Code: [email or web address]  URL: [email or web address]  Date: 10/17/2023  Prepared by: Char Gentile    Exercises  - Supine Chin Tuck  - 2 x daily - 10  reps - 5 sec hold  - Supine Shoulder Protraction with Dowel  - 2 x daily - 10 reps - 5 sec hold  - Seated Scapular Retraction  - 2 x daily - 10 reps - 5 sec hold  - Correct Seated Posture  - 2 x daily - 10 reps - 5 sec hold    History # of Personal Factors and/or Comorbidities: MODERATE (1-2)  Examination of Body System(s): # of elements: MODERATE (3)  Clinical Presentation: EVOLVING  Clinical Decision Making: MODERATE      Eval:  Low Eval          Mins  19789  Mod Eval     25     Mins  17342  High Eval                            Mins  53874    Timed:         Manual Therapy:    8     mins  13569;     Therapeutic Exercise:         mins  06417;     Neuromuscular Wendy:    15    mins  25224;    Therapeutic Activity:          mins  47892;     Gait Training:           mins  85121;       Un-Timed:  Electrical Stimulation:         mins  46491 ( );  Traction          mins 37810      Timed Treatment:   23   mins   Total Treatment:     48   mins    PT SIGNATURE: Char Gentile PT, DPT, OCS           [place] License # [id number]              DATE TREATMENT INITIATED: 10/17/2023    Initial Certification  Certification Period: 1/14/2024  I certify that the therapy services are furnished while this patient is under my care.  The services outlined above are required by this patient, and will be reviewed every 90 days.     PHYSICIAN: Alessandra Miranda MD      DATE:     Please sign and return via fax to [phone].. Thank you, [facility].

## 2023-10-19 ENCOUNTER — TREATMENT (OUTPATIENT)
Dept: PHYSICAL THERAPY | Facility: CLINIC | Age: 88
End: 2023-10-19
Payer: MEDICARE

## 2023-10-19 DIAGNOSIS — M50.30 DDD (DEGENERATIVE DISC DISEASE), CERVICAL: ICD-10-CM

## 2023-10-19 DIAGNOSIS — M54.2 NECK PAIN ON RIGHT SIDE: Primary | ICD-10-CM

## 2023-10-19 PROCEDURE — 97110 THERAPEUTIC EXERCISES: CPT | Performed by: PHYSICAL THERAPIST

## 2023-10-19 PROCEDURE — 97112 NEUROMUSCULAR REEDUCATION: CPT | Performed by: PHYSICAL THERAPIST

## 2023-10-19 PROCEDURE — 97140 MANUAL THERAPY 1/> REGIONS: CPT | Performed by: PHYSICAL THERAPIST

## 2023-10-19 NOTE — ASSESSMENT & PLAN NOTE
Discussed intermittent application of heat,  analgesics and muscle relaxants are recommended. Call or return to clinic prn if these symptoms worsen or fail to improve as anticipated.

## 2023-10-19 NOTE — PROGRESS NOTES
Physical Therapy Daily Note  Office: [address] [facility] [address], [place] [address]  P: [phone]  F: [phone]    Patient: Ro Mejia   : 1/3/1931  Diagnosis/ICD-10 Code:  Neck pain on right side [M54.2]  Referring practitioner: Alessandra Miranda MD  Today's Date: 10/19/2023  Patient seen for 2 sessions                                                                                                                                                                                                                                                                                                                               VISIT#: /10 sessions authorized per ins (PN due: 2023)     Subjective  Ro Mejia reports that she felt fine after last session. Still hurting on the R side of her neck.       Objective  Increased thoracic kyphosis as well as cervical lordosis   Increased muscle guarding R side cervical spine     See Exercise, Manual, and Modality Logs for complete treatment.     Home Exercises:  [id number]     Assessment/Plan   Pt demonstrates decreased OA flexion as well as thoracic extension. Pt able to perform exercises well with min cuing to decrease UT activation. No c/o increased pain with treatment this date. Encouraged pt to continue using heating pad at home.       Progress per Plan of Care and Progress strengthening /stabilization /functional activity            Timed:         Manual Therapy:    15     mins  12943;     Therapeutic Exercise:    8     mins  77247;     Neuromuscular Wendy:    15    mins  90302;    Therapeutic Activity:          mins  53213;       Un-Timed:  Electrical Stimulation:         mins  55663 ( );    Timed Treatment:   38   mins   Total Treatment:     38   mins    Char Gentile, PT, DPT, OCS     [place] License # [id number]

## 2023-10-24 ENCOUNTER — TREATMENT (OUTPATIENT)
Dept: PHYSICAL THERAPY | Facility: CLINIC | Age: 88
End: 2023-10-24
Payer: MEDICARE

## 2023-10-24 DIAGNOSIS — M54.2 NECK PAIN ON RIGHT SIDE: Primary | ICD-10-CM

## 2023-10-24 DIAGNOSIS — M50.30 DDD (DEGENERATIVE DISC DISEASE), CERVICAL: ICD-10-CM

## 2023-10-24 PROCEDURE — 97110 THERAPEUTIC EXERCISES: CPT | Performed by: PHYSICAL THERAPIST

## 2023-10-24 PROCEDURE — 97140 MANUAL THERAPY 1/> REGIONS: CPT | Performed by: PHYSICAL THERAPIST

## 2023-10-24 PROCEDURE — 97112 NEUROMUSCULAR REEDUCATION: CPT | Performed by: PHYSICAL THERAPIST

## 2023-10-24 NOTE — PROGRESS NOTES
Physical Therapy Daily Note  Office: [address] [facility] [address], [place] [address]  P: [phone]  F: [phone]    Patient: Ro Mejia   : 1/3/1931  Diagnosis/ICD-10 Code:  Neck pain on right side [M54.2]  Referring practitioner: Alessandra Miranda MD  Today's Date: 10/24/2023  Patient seen for 3 sessions                                                                                                                                                                                                                                                                                                                               VISIT#: 3/10 sessions authorized per ins (PN due: 2023)     Subjective  Ro Mejia reports that her neck pain is still there but exercises do seem to help when she does them.       Objective  Increased thoracic kyphosis as well as cervical lordosis   Increased muscle guarding R side cervical spine     See Exercise, Manual, and Modality Logs for complete treatment.     Home Exercises:  [id number]     Assessment/Plan   Pt demonstrates continued tightness with OA flex with stretch noted during passive flex. No increased pain with treatment this date. Pt able to perform scap squeezes and shoulder rolls better compared to last session with min cuing required.       Progress per Plan of Care and Progress strengthening /stabilization /functional activity            Timed:         Manual Therapy:    15     mins  40140;     Therapeutic Exercise:    8     mins  97153;     Neuromuscular Wendy:    15    mins  67744;    Therapeutic Activity:          mins  83491;       Un-Timed:  Electrical Stimulation:         mins  79221 ( );    Timed Treatment:   38   mins   Total Treatment:     38   mins    Char Gentile, PT, DPT, OCS     [place] License # [id number]

## 2023-10-26 ENCOUNTER — TREATMENT (OUTPATIENT)
Dept: PHYSICAL THERAPY | Facility: CLINIC | Age: 88
End: 2023-10-26
Payer: MEDICARE

## 2023-10-26 DIAGNOSIS — M54.2 NECK PAIN ON RIGHT SIDE: Primary | ICD-10-CM

## 2023-10-26 DIAGNOSIS — M50.30 DDD (DEGENERATIVE DISC DISEASE), CERVICAL: ICD-10-CM

## 2023-10-26 PROCEDURE — 97140 MANUAL THERAPY 1/> REGIONS: CPT | Performed by: PHYSICAL THERAPIST

## 2023-10-26 PROCEDURE — 97110 THERAPEUTIC EXERCISES: CPT | Performed by: PHYSICAL THERAPIST

## 2023-10-26 PROCEDURE — 97112 NEUROMUSCULAR REEDUCATION: CPT | Performed by: PHYSICAL THERAPIST

## 2023-10-26 NOTE — PROGRESS NOTES
Physical Therapy Daily Note  Office: [address] [facility] [address], [place] [address]  P: [phone]  F: [phone]    Patient: Ro Mejia   : 1/3/1931  Diagnosis/ICD-10 Code:  Neck pain on right side [M54.2]  Referring practitioner: Alessandra Miranda MD  Today's Date: 10/26/2023  Patient seen for 4 sessions                                                                                                                                                                                                                                                                                                                               VISIT#: 4/10 sessions authorized per ins (PN due: 2023)     Subjective  Ro Mejia reports that she did have some pain after last session so she took arthritis tylenol which helped. Also woke up with pain in the R side of neck this morning and took medication again and it went away.     Objective  Increased thoracic kyphosis as well as cervical lordosis   Increased muscle guarding R side cervical spine, marco suboccipitals      See Exercise, Manual, and Modality Logs for complete treatment.     Home Exercises:  [id number]     Assessment/Plan   Pt demonstrates good progress with OA flex in supine. Some difficulty maintaining OA flex with cervical retraction, however improved with cuing. Pt able to perform other exercises well without increased pain. Instructed pt to use heat on neck when she gets home.        Progress per Plan of Care and Progress strengthening /stabilization /functional activity            Timed:         Manual Therapy:    15     mins  62864;     Therapeutic Exercise:    8     mins  29912;     Neuromuscular Wendy:    15    mins  70649;    Therapeutic Activity:          mins  21031;       Un-Timed:  Electrical Stimulation:         mins  85278 ( );    Timed Treatment:   38   mins   Total Treatment:     38   mins    Char Gentile, PT, DPT, OCS     [place] License #  [id number]

## 2023-10-31 ENCOUNTER — TREATMENT (OUTPATIENT)
Dept: PHYSICAL THERAPY | Facility: CLINIC | Age: 88
End: 2023-10-31
Payer: MEDICARE

## 2023-10-31 DIAGNOSIS — M54.2 NECK PAIN ON RIGHT SIDE: Primary | ICD-10-CM

## 2023-10-31 DIAGNOSIS — M50.30 DDD (DEGENERATIVE DISC DISEASE), CERVICAL: ICD-10-CM

## 2023-10-31 PROCEDURE — 97110 THERAPEUTIC EXERCISES: CPT | Performed by: PHYSICAL THERAPIST

## 2023-10-31 PROCEDURE — 97112 NEUROMUSCULAR REEDUCATION: CPT | Performed by: PHYSICAL THERAPIST

## 2023-10-31 PROCEDURE — 97140 MANUAL THERAPY 1/> REGIONS: CPT | Performed by: PHYSICAL THERAPIST

## 2023-10-31 NOTE — PROGRESS NOTES
Physical Therapy Daily Note  Office: [address] [facility] [address], [place] [address]  P: [phone]  F: [phone]    Patient: Ro Mejia   : 1/3/1931  Diagnosis/ICD-10 Code:  Neck pain on right side [M54.2]  Referring practitioner: Alessandra Miranda MD  Today's Date: 10/31/2023  Patient seen for 5 sessions                                                                                                                                                                                                                                                                                                                               VISIT#: 5/10 sessions authorized per ins (PN due: 2023)     Subjective  Ro Mejia reports that she feels like her pain is better some days but other days, it still bothers her. Pain is better today than it was yesterday. Thinks it was worse yesterday because of the rainy weather.     Objective  Increased thoracic kyphosis as well as cervical lordosis   Increased muscle guarding R side cervical spine, marco suboccipitals/SCM      See Exercise, Manual, and Modality Logs for complete treatment.     Home Exercises:  [id number]     Assessment/Plan   Pt demonstrates tightness and muscle guarding along cervical spine, most noted in R suboccipitals. Improved some with manual treatment. Added seated cervical rotation and cued pt to only move in pain-free range. No c/o pain with exercises this date. Instructed pt to use heating pad at home after session today.       Progress per Plan of Care and Progress strengthening /stabilization /functional activity            Timed:         Manual Therapy:    15     mins  23686;     Therapeutic Exercise:    8     mins  31373;     Neuromuscular Wendy:    15    mins  68969;    Therapeutic Activity:          mins  02087;       Un-Timed:  Electrical Stimulation:         mins  08088 ( );    Timed Treatment:   38   mins   Total Treatment:     38    mins    Char Gentile, PT, DPT, OCS     [place] License # [id number]

## 2023-11-02 ENCOUNTER — TREATMENT (OUTPATIENT)
Dept: PHYSICAL THERAPY | Facility: CLINIC | Age: 88
End: 2023-11-02
Payer: MEDICARE

## 2023-11-02 DIAGNOSIS — M54.2 NECK PAIN ON RIGHT SIDE: Primary | ICD-10-CM

## 2023-11-02 DIAGNOSIS — M50.30 DDD (DEGENERATIVE DISC DISEASE), CERVICAL: ICD-10-CM

## 2023-11-02 PROCEDURE — 97140 MANUAL THERAPY 1/> REGIONS: CPT | Performed by: PHYSICAL THERAPIST

## 2023-11-02 PROCEDURE — 97112 NEUROMUSCULAR REEDUCATION: CPT | Performed by: PHYSICAL THERAPIST

## 2023-11-02 PROCEDURE — 97110 THERAPEUTIC EXERCISES: CPT | Performed by: PHYSICAL THERAPIST

## 2023-11-02 NOTE — PROGRESS NOTES
Physical Therapy Daily Note  Office: [address] [facility] [address], [place] [address]  P: [phone]  F: [phone]    Patient: Ro Mejia   : 1/3/1931  Diagnosis/ICD-10 Code:  Neck pain on right side [M54.2]  Referring practitioner: Alessandra Miranda MD  Today's Date: 2023  Patient seen for 6 sessions                                                                                                                                                                                                                                                                                                                               VISIT#: 6/10 sessions authorized per ins (PN due: 2023)     Subjective  Ro Mejia reports that her neck is feeling good today. The cold usually makes it feel worse though.     Objective  Increased thoracic kyphosis as well as cervical lordosis   Increased muscle guarding R side cervical spine, marco suboccipitals/SCM      See Exercise, Manual, and Modality Logs for complete treatment.     Home Exercises:  [id number]     Assessment/Plan   Pt demonstrates good progress with OA flex and activation of scap stabilizers with exercises. Still some restrictions with OA flex and increased thoracic kyphosis, however pt's postural awareness is improving and able to do exercises with proper technique with min cuing.       Progress per Plan of Care and Progress strengthening /stabilization /functional activity            Timed:         Manual Therapy:    15     mins  90173;     Therapeutic Exercise:    8     mins  51888;     Neuromuscular Wendy:    15    mins  90894;    Therapeutic Activity:          mins  26867;       Un-Timed:  Electrical Stimulation:         mins  05480 ( );    Timed Treatment:   38   mins   Total Treatment:     38   mins    Char Gentile, PT, DPT, OCS     [place] License # [id number]

## 2023-11-07 ENCOUNTER — TREATMENT (OUTPATIENT)
Dept: PHYSICAL THERAPY | Facility: CLINIC | Age: 88
End: 2023-11-07
Payer: MEDICARE

## 2023-11-07 DIAGNOSIS — M50.30 DDD (DEGENERATIVE DISC DISEASE), CERVICAL: ICD-10-CM

## 2023-11-07 DIAGNOSIS — M54.2 NECK PAIN ON RIGHT SIDE: Primary | ICD-10-CM

## 2023-11-07 PROCEDURE — 97110 THERAPEUTIC EXERCISES: CPT | Performed by: PHYSICAL THERAPIST

## 2023-11-07 PROCEDURE — 97140 MANUAL THERAPY 1/> REGIONS: CPT | Performed by: PHYSICAL THERAPIST

## 2023-11-07 PROCEDURE — 97112 NEUROMUSCULAR REEDUCATION: CPT | Performed by: PHYSICAL THERAPIST

## 2023-11-07 NOTE — PROGRESS NOTES
Physical Therapy Daily Note  Office: [address] [facility] [address], [place] [address]  P: [phone]  F: [phone]    Patient: Ro Mejia   : 1/3/1931  Diagnosis/ICD-10 Code:  Neck pain on right side [M54.2]  Referring practitioner: Alessandra Miranda MD  Today's Date: 2023  Patient seen for 7 sessions                                                                                                                                                                                                                                                                                                                               VISIT#: 7/10 sessions authorized per ins (PN due: 2023)     Subjective  Ro Mejia reports that her neck is still bothering her. Has been hurting more the last few days. No activity that causes pain specifically. She thinks it's just from the weather starting to get colder.     Objective  Increased thoracic kyphosis as well as cervical lordosis   Increased muscle guarding R side cervical spine, marco suboccipitals/SCM      See Exercise, Manual, and Modality Logs for complete treatment.     Home Exercises:  [id number]     Assessment/Plan   Pt demonstrates tightness in the upper cervical spine, especially on the R. Improved some with manual treatment. Attempted cervical rotation this date, however pt noted pain with R rotation so held. Pt able to perform cervical sidebending B without increased pain. Progressed to 2 sets with strengthening exercises and pt able to perform well with no c/o increased pain. Some cuing required for neutral position of upper cervical spine during exercises.       Progress per Plan of Care and Progress strengthening /stabilization /functional activity            Timed:         Manual Therapy:    15     mins  35051;     Therapeutic Exercise:    8     mins  75667;     Neuromuscular Wendy:    15    mins  42413;    Therapeutic Activity:          mins  12925;        Un-Timed:  Electrical Stimulation:         mins  62988 ( );    Timed Treatment:   38   mins   Total Treatment:     38   mins    Char Gentile, PT, DPT, OCS     [place] License # [id number]

## 2023-11-10 ENCOUNTER — TREATMENT (OUTPATIENT)
Dept: PHYSICAL THERAPY | Facility: CLINIC | Age: 88
End: 2023-11-10
Payer: MEDICARE

## 2023-11-10 DIAGNOSIS — M54.2 NECK PAIN ON RIGHT SIDE: Primary | ICD-10-CM

## 2023-11-10 DIAGNOSIS — M50.30 DDD (DEGENERATIVE DISC DISEASE), CERVICAL: ICD-10-CM

## 2023-11-10 PROCEDURE — 97110 THERAPEUTIC EXERCISES: CPT | Performed by: PHYSICAL THERAPIST

## 2023-11-10 PROCEDURE — 97112 NEUROMUSCULAR REEDUCATION: CPT | Performed by: PHYSICAL THERAPIST

## 2023-11-10 PROCEDURE — 97140 MANUAL THERAPY 1/> REGIONS: CPT | Performed by: PHYSICAL THERAPIST

## 2023-11-10 NOTE — PROGRESS NOTES
Physical Therapy Daily Note  Office: [address] [facility] [address], [place] [address]  P: [phone]  F: [phone]    Patient: Ro Mejia   : 1/3/1931  Diagnosis/ICD-10 Code:  Neck pain on right side [M54.2]  Referring practitioner: Alessandra Miranda MD  Today's Date: 11/10/2023  Patient seen for 8 sessions                                                                                                                                                                                                                                                                                                                               VISIT#: 8/10 sessions authorized per ins (PN due: 2023)     Subjective  Ro Mejia reports that her neck has been hurting more the last couple of days. She was at a  home the last two days because her brother-in-law passed away. Was sitting a lot and looking up at people who were talking with her and that has made the neck hurt more.     Objective  Increased thoracic kyphosis as well as cervical lordosis   Increased muscle guarding R side cervical spine, marco suboccipitals/SCM      See Exercise, Manual, and Modality Logs for complete treatment.     Home Exercises:  [id number]     Assessment/Plan   Pt demonstrates continued tightness in R UT/SCM. Some mild improvement with manual treatment. Discussed posture position with patient again today and instructed her to try to lengthen spine and sit taller while chin is gently tucked in to avoid excessive OA extension, especially when performing cervical extension like to look up at someone. Pt able to perform exercises well with no c/o increased pain.       Progress per Plan of Care and Progress strengthening /stabilization /functional activity            Timed:         Manual Therapy:    15     mins  23093;     Therapeutic Exercise:    8     mins  61332;     Neuromuscular Wendy:    15    mins  21043;    Therapeutic Activity:           mins  73040;       Un-Timed:  Electrical Stimulation:         mins  70767 ( );    Timed Treatment:   38   mins   Total Treatment:     38   mins    Char Gentile, PT, DPT, OCS     [place] License # [id number]

## 2023-11-14 ENCOUNTER — TREATMENT (OUTPATIENT)
Dept: PHYSICAL THERAPY | Facility: CLINIC | Age: 88
End: 2023-11-14
Payer: MEDICARE

## 2023-11-14 DIAGNOSIS — M50.30 DDD (DEGENERATIVE DISC DISEASE), CERVICAL: ICD-10-CM

## 2023-11-14 DIAGNOSIS — M54.2 NECK PAIN ON RIGHT SIDE: Primary | ICD-10-CM

## 2023-11-14 PROCEDURE — 97140 MANUAL THERAPY 1/> REGIONS: CPT | Performed by: PHYSICAL THERAPIST

## 2023-11-14 PROCEDURE — 97112 NEUROMUSCULAR REEDUCATION: CPT | Performed by: PHYSICAL THERAPIST

## 2023-11-14 PROCEDURE — 97110 THERAPEUTIC EXERCISES: CPT | Performed by: PHYSICAL THERAPIST

## 2023-11-14 NOTE — PROGRESS NOTES
Physical Therapy Daily Note  Office: [address] [facility] [address], [place] [address]  P: [phone]  F: [phone]    Patient: Ro Mejia   : 1/3/1931  Diagnosis/ICD-10 Code:  Neck pain on right side [M54.2]  Referring practitioner: Alessandra Miranda MD  Today's Date: 2023  Patient seen for 9 sessions                                                                                                                                                                                                                                                                                                                               VISIT#: 9/10 sessions authorized per ins (PN due: 2023)     Subjective  Ro Mejia reports that she continues to have some pain on the R side of her neck. Has good days and bad days. Has been hurting some still but not like last week.     Objective  Increased thoracic kyphosis as well as cervical lordosis   Increased muscle guarding R side cervical spine, marco suboccipitals/SCM      See Exercise, Manual, and Modality Logs for complete treatment.     Home Exercises:  [id number]     Assessment/Plan   Pt demonstrates min tenderness along SCM on the R. Pt able to perform exercises well with some difficulty with cervical spine AROM due to tightness. Required cuing for proper technique. No c/o pain with exercises.       Progress per Plan of Care and Progress strengthening /stabilization /functional activity            Timed:         Manual Therapy:    15     mins  12532;     Therapeutic Exercise:    8     mins  75108;     Neuromuscular Wendy:    15    mins  17860;    Therapeutic Activity:          mins  32848;       Un-Timed:  Electrical Stimulation:         mins  56390 ( );    Timed Treatment:   38   mins   Total Treatment:     38   mins    Char Gentile, PT, DPT, OCS     [place] License # [id number]

## 2023-11-16 ENCOUNTER — TREATMENT (OUTPATIENT)
Dept: PHYSICAL THERAPY | Facility: CLINIC | Age: 88
End: 2023-11-16
Payer: MEDICARE

## 2023-11-16 DIAGNOSIS — M50.30 DDD (DEGENERATIVE DISC DISEASE), CERVICAL: ICD-10-CM

## 2023-11-16 DIAGNOSIS — M54.2 NECK PAIN ON RIGHT SIDE: Primary | ICD-10-CM

## 2023-11-16 PROCEDURE — 97112 NEUROMUSCULAR REEDUCATION: CPT | Performed by: PHYSICAL THERAPIST

## 2023-11-16 PROCEDURE — 97140 MANUAL THERAPY 1/> REGIONS: CPT | Performed by: PHYSICAL THERAPIST

## 2023-11-16 PROCEDURE — 97110 THERAPEUTIC EXERCISES: CPT | Performed by: PHYSICAL THERAPIST

## 2023-11-16 NOTE — PROGRESS NOTES
Re-Assessment/Progress Note  Office: [address] [facility] [address], [place] [address]  P: [phone]   F: [phone]        Patient: Ro Mejia   : 1/3/1931  Diagnosis/ICD-10 Code:  Neck pain on right side [M54.2]  Referring practitioner: Alessandra Miranda MD  Date of Initial Visit: Type: THERAPY  Noted: 10/17/2023  Today's Date: 2023  Patient seen for 10 sessions      Subjective:   Ro Mejia reports that she does feel like the therapy is helping sometimes, however other times she still has pain. Pain in the R side of neck is there when she turns her head and also when tilting the head back like to look up at someone or to take a drink. Exercises are going well at home. She isn't sure if she's making a lot of progress overall since pain comes and goes. Has follow-up with MD and will discuss their plan for her then.     Pain: Current: /10, Best: 0/10, Worst: 6/10    Subjective Questionnaire: NDI: 26% impairment   Clinical Progress: improved  Home Program Compliance: Yes  Treatment has included: therapeutic exercise, neuromuscular re-education, manual therapy, and therapeutic activity    Objective          Active Range of Motion   Cervical/Thoracic Spine   Cervical    Flexion: 52 degrees   Extension: 31 degrees with pain  Left lateral flexion: 26 degrees   Right lateral flexion: 16 degrees   Left rotation: 40 degrees   Right rotation: 30 degrees with pain      Assessment & Plan       Assessment  Impairments: abnormal coordination, abnormal muscle firing, abnormal muscle tone, abnormal or restricted ROM, activity intolerance, impaired physical strength, lacks appropriate home exercise program, pain with function and weight-bearing intolerance   Functional limitations: carrying objects, lifting, sleeping, pulling, pushing, uncomfortable because of pain and reaching overhead   Assessment details: Pt demonstrates good progress with activation of DNF as well as scap stabilizers, however still continues  to have pain with ROM of the neck as well as increased muscle guarding in the R side cervical spine. Pt has difficulty with rotation of cervical spine due to pain. Pt has some improvement in pain overall, however she does still have pain intermittently. Pt plans to see MD and then determine if she wants to continue PT.   Prognosis: good    Goals  Plan Goals: STG (3 weeks):  Pt will demonstrate increased activation of scap stabilizers and DNF during activities/exercises to decrease load on cervical spine. - partially met  Pt will display improved ROM of cervical spine to WFL with min to no pain to assist with functional tasks. - not met     LTG (6 weeks):  Pt will be independent with home exercises to assist with improved strength and continue to improve function. - mostly met   Pt will demonstrate decreased score on the NDI to 10% to demonstrate decreased overall impairment. - not met  Pt will be able to perform housework and ADL's as well as reading with min to no increased tightness or pain in the cervical spine for improved function. - partially met  Pt will demonstrate improved cervical spine and scapular strength to 4-4+/5 overall to assist with function.  - progressing     Plan  Therapy options: will be seen for skilled therapy services  Planned modality interventions: cryotherapy, TENS, thermotherapy (hydrocollator packs) and traction  Planned therapy interventions: ADL retraining, body mechanics training, fine motor coordination training, flexibility, functional ROM exercises, home exercise program, joint mobilization, manual therapy, motor coordination training, neuromuscular re-education, postural training, soft tissue mobilization, spinal/joint mobilization, strengthening, stretching and therapeutic activities  Frequency: 2x week  Duration in weeks: 4  Treatment plan discussed with: patient      Progress toward previous goals: Partially Met        Recommendations: Continue with recommendations Pt  instructed to contact MD to discuss further treatment options.   Timeframe: 1 month  Prognosis to achieve goals: fair          Based upon review of the patient's progress and continued therapy plan, it is my medical opinion that Ro Mejia should continue physical therapy treatment at [facility]  [address]  [place] [address] [phone].    Signature: __________________________________  Alessandra Miranda MD    Timed:         Manual Therapy:    8     mins  31556;     Therapeutic Exercise:    15     mins  84024;     Neuromuscular Wendy:    15    mins  58899;    Therapeutic Activity:          mins  15699;     Gait Training:           mins  88130;     Ultrasound:          mins  27562;    Ionto                                   mins   44411  Self Care                            mins   26059    Un-Timed:  Electrical Stimulation:         mins  22018 (MC );  Traction          mins 67168  Re-Eval                               mins  88477      Timed Treatment:   38   mins   Total Treatment:     38   mins      PT Signature: Char Gentile, PT, DPT, OCS     [place] License # [id number]

## 2023-11-20 ENCOUNTER — OFFICE VISIT (OUTPATIENT)
Dept: FAMILY MEDICINE CLINIC | Facility: CLINIC | Age: 88
End: 2023-11-20
Payer: MEDICARE

## 2023-11-20 VITALS
DIASTOLIC BLOOD PRESSURE: 75 MMHG | BODY MASS INDEX: 26.28 KG/M2 | WEIGHT: 142.8 LBS | TEMPERATURE: 98.2 F | SYSTOLIC BLOOD PRESSURE: 116 MMHG | OXYGEN SATURATION: 97 % | HEIGHT: 62 IN | HEART RATE: 75 BPM

## 2023-11-20 DIAGNOSIS — Z23 NEED FOR VACCINATION: ICD-10-CM

## 2023-11-20 DIAGNOSIS — M54.2 NECK PAIN ON RIGHT SIDE: Primary | ICD-10-CM

## 2023-11-20 DIAGNOSIS — R63.4 WEIGHT LOSS, UNINTENTIONAL: ICD-10-CM

## 2023-11-20 RX ORDER — TRAMADOL HYDROCHLORIDE 50 MG/1
50 TABLET ORAL EVERY 12 HOURS PRN
Qty: 60 TABLET | Refills: 0 | Status: SHIPPED | OUTPATIENT
Start: 2023-11-20

## 2023-11-20 NOTE — PROGRESS NOTES
"Chief Complaint  Neck Pain (X 3 month - constant pain /)    Subjective        Ro Mejia presents to [facility]  Neck Pain   This is a new problem. The current episode started more than 1 month ago. The problem occurs constantly. The problem has been unchanged. The pain is associated with nothing. The pain is present in the right side and occipital region. The quality of the pain is described as aching. The pain is moderate. The symptoms are aggravated by position. Pertinent negatives include no chest pain or headaches. She has tried acetaminophen and home exercises (physical therapy) for the symptoms.       Objective   Vital Signs:  /75 (BP Location: Left arm, Patient Position: Sitting, Cuff Size: Adult)   Pulse 75   Temp 98.2 °F (36.8 °C) (Infrared)   Ht 157.5 cm (62\")   Wt 64.8 kg (142 lb 12.8 oz)   SpO2 97%   BMI 26.12 kg/m²   Estimated body mass index is 26.12 kg/m² as calculated from the following:    Height as of this encounter: 157.5 cm (62\").    Weight as of this encounter: 64.8 kg (142 lb 12.8 oz).     BMI is >= 25 and <30. (Overweight) The following options were offered after discussion;: exercise counseling/recommendations           Physical Exam  Vitals and nursing note reviewed.   Constitutional:       General: She is not in acute distress.     Appearance: She is well-developed.   HENT:      Head: Normocephalic.   Eyes:      General: Lids are normal.      Conjunctiva/sclera: Conjunctivae normal.   Neck:      Thyroid: No thyroid mass or thyromegaly.      Trachea: Trachea normal.   Cardiovascular:      Rate and Rhythm: Normal rate and regular rhythm.      Heart sounds: Normal heart sounds.   Pulmonary:      Effort: Pulmonary effort is normal.      Breath sounds: Normal breath sounds.   Musculoskeletal:      Cervical back: Normal range of motion. Tenderness present. No rigidity.   Lymphadenopathy:      Cervical: No cervical adenopathy.   Skin:     " General: Skin is warm and dry.   Neurological:      Mental Status: She is alert and oriented to person, place, and time.   Psychiatric:         Attention and Perception: She is attentive.         Mood and Affect: Mood normal.         Speech: Speech normal.         Behavior: Behavior normal.        Result Review :  The following data was reviewed by: Bianca Reid MD on 11/20/2023:  Common labs          1/10/2023    09:09 7/11/2023    09:13   Common Labs   Glucose 95  104    BUN 21  19    Creatinine 1.39  1.29    Sodium 139  139    Potassium 3.9  3.2    Chloride 99  98    Calcium 10.1  10.1    Albumin 4.4  4.3    Total Bilirubin 0.7  1.0    Alkaline Phosphatase 82  70    AST (SGOT) 18  17    ALT (SGPT) 9  9    WBC  8.72    Hemoglobin  12.9    Hematocrit  38.7    Platelets  238    Total Cholesterol 135  130    Triglycerides 49  51    HDL Cholesterol 65  68    LDL Cholesterol  59  50                   Assessment and Plan   Diagnoses and all orders for this visit:    1. Neck pain on right side (Primary)  -     traMADol (ULTRAM) 50 MG tablet; Take 1 tablet by mouth Every 12 (Twelve) Hours As Needed for Moderate Pain.  Dispense: 60 tablet; Refill: 0    2. Weight loss, unintentional    3. Need for vaccination  -     Pneumococcal Conjugate Vaccine 20-Valent (PCV20)             Follow Up   No follow-ups on file.  Patient was given instructions and counseling regarding her condition or for health maintenance advice. Please see specific information pulled into the AVS if appropriate.

## 2023-12-01 ENCOUNTER — TELEPHONE (OUTPATIENT)
Dept: FAMILY MEDICINE CLINIC | Facility: CLINIC | Age: 88
End: 2023-12-01
Payer: MEDICARE

## 2023-12-01 NOTE — TELEPHONE ENCOUNTER
Caller: LESLIE DOUGLAS    Relationship: Emergency Contact    Best call back number:     [phone]       Which medication are you concerned about: TRAMADOL    Who prescribed you this medication: SALMA    When did you start taking this medication:     What are your concerns: EXPERIENCED NAUSEA AND VOMITING     PHARMACY:[id number] [place] - [address] AT SEC OF [facility] [address] & [facility] [address]  - [phone]  - [phone] FX

## 2023-12-04 RX ORDER — BACLOFEN 10 MG/1
10 TABLET ORAL 2 TIMES DAILY PRN
Qty: 30 TABLET | Refills: 1 | Status: SHIPPED | OUTPATIENT
Start: 2023-12-04

## 2023-12-04 NOTE — TELEPHONE ENCOUNTER
Does she want to try something else for pain?  She could try a mild muscle relaxer like Baclofen.

## 2023-12-27 DIAGNOSIS — I10 ESSENTIAL HYPERTENSION: ICD-10-CM

## 2023-12-27 RX ORDER — DILTIAZEM HYDROCHLORIDE 180 MG/1
180 CAPSULE, COATED, EXTENDED RELEASE ORAL DAILY
Qty: 90 CAPSULE | Refills: 3 | Status: SHIPPED | OUTPATIENT
Start: 2023-12-27

## 2024-01-15 ENCOUNTER — LAB (OUTPATIENT)
Dept: FAMILY MEDICINE CLINIC | Facility: CLINIC | Age: 89
End: 2024-01-15
Payer: MEDICARE

## 2024-01-15 ENCOUNTER — OFFICE VISIT (OUTPATIENT)
Dept: FAMILY MEDICINE CLINIC | Facility: CLINIC | Age: 89
End: 2024-01-15
Payer: MEDICARE

## 2024-01-15 VITALS
SYSTOLIC BLOOD PRESSURE: 134 MMHG | BODY MASS INDEX: 25.88 KG/M2 | OXYGEN SATURATION: 96 % | WEIGHT: 140.6 LBS | HEART RATE: 69 BPM | TEMPERATURE: 97.6 F | HEIGHT: 62 IN | DIASTOLIC BLOOD PRESSURE: 82 MMHG

## 2024-01-15 DIAGNOSIS — I10 ESSENTIAL HYPERTENSION: ICD-10-CM

## 2024-01-15 DIAGNOSIS — E78.5 HYPERLIPIDEMIA, UNSPECIFIED HYPERLIPIDEMIA TYPE: ICD-10-CM

## 2024-01-15 DIAGNOSIS — Z00.00 MEDICARE ANNUAL WELLNESS VISIT, SUBSEQUENT: Primary | ICD-10-CM

## 2024-01-15 LAB
ALBUMIN SERPL-MCNC: 4.6 G/DL (ref 3.5–5.2)
ALBUMIN/GLOB SERPL: 2.1 G/DL
ALP SERPL-CCNC: 53 U/L (ref 39–117)
ALT SERPL W P-5'-P-CCNC: 14 U/L (ref 1–33)
ANION GAP SERPL CALCULATED.3IONS-SCNC: 11 MMOL/L (ref 5–15)
AST SERPL-CCNC: 14 U/L (ref 1–32)
BILIRUB SERPL-MCNC: 0.5 MG/DL (ref 0–1.2)
BUN SERPL-MCNC: 20 MG/DL (ref 8–23)
BUN/CREAT SERPL: 14 (ref 7–25)
CALCIUM SPEC-SCNC: 10.1 MG/DL (ref 8.2–9.6)
CHLORIDE SERPL-SCNC: 101 MMOL/L (ref 98–107)
CHOLEST SERPL-MCNC: 135 MG/DL (ref 0–200)
CO2 SERPL-SCNC: 30 MMOL/L (ref 22–29)
CREAT SERPL-MCNC: 1.43 MG/DL (ref 0.57–1)
EGFRCR SERPLBLD CKD-EPI 2021: 34.3 ML/MIN/1.73
GLOBULIN UR ELPH-MCNC: 2.2 GM/DL
GLUCOSE SERPL-MCNC: 90 MG/DL (ref 65–99)
HDLC SERPL-MCNC: 66 MG/DL (ref 40–60)
LDLC SERPL CALC-MCNC: 54 MG/DL (ref 0–100)
LDLC/HDLC SERPL: 0.82 {RATIO}
POTASSIUM SERPL-SCNC: 3.7 MMOL/L (ref 3.5–5.2)
PROT SERPL-MCNC: 6.8 G/DL (ref 6–8.5)
SODIUM SERPL-SCNC: 142 MMOL/L (ref 136–145)
TRIGL SERPL-MCNC: 73 MG/DL (ref 0–150)
VLDLC SERPL-MCNC: 15 MG/DL (ref 5–40)

## 2024-01-15 PROCEDURE — 80061 LIPID PANEL: CPT | Performed by: FAMILY MEDICINE

## 2024-01-15 PROCEDURE — 36415 COLL VENOUS BLD VENIPUNCTURE: CPT | Performed by: FAMILY MEDICINE

## 2024-01-15 PROCEDURE — 1170F FXNL STATUS ASSESSED: CPT | Performed by: FAMILY MEDICINE

## 2024-01-15 PROCEDURE — 1159F MED LIST DOCD IN RCRD: CPT | Performed by: FAMILY MEDICINE

## 2024-01-15 PROCEDURE — 99214 OFFICE O/P EST MOD 30 MIN: CPT | Performed by: FAMILY MEDICINE

## 2024-01-15 PROCEDURE — 1160F RVW MEDS BY RX/DR IN RCRD: CPT | Performed by: FAMILY MEDICINE

## 2024-01-15 PROCEDURE — G0439 PPPS, SUBSEQ VISIT: HCPCS | Performed by: FAMILY MEDICINE

## 2024-01-15 PROCEDURE — 80053 COMPREHEN METABOLIC PANEL: CPT | Performed by: FAMILY MEDICINE

## 2024-01-15 RX ORDER — ATORVASTATIN CALCIUM 10 MG/1
10 TABLET, FILM COATED ORAL NIGHTLY
Qty: 90 TABLET | Refills: 3 | Status: SHIPPED | OUTPATIENT
Start: 2024-01-15

## 2024-01-15 RX ORDER — METOLAZONE 2.5 MG/1
TABLET ORAL
Qty: 36 TABLET | Refills: 3 | Status: SHIPPED | OUTPATIENT
Start: 2024-01-15

## 2024-01-15 RX ORDER — FUROSEMIDE 20 MG/1
TABLET ORAL
Qty: 48 TABLET | Refills: 3 | Status: SHIPPED | OUTPATIENT
Start: 2024-01-15

## 2024-01-15 RX ORDER — DILTIAZEM HYDROCHLORIDE 180 MG/1
1 CAPSULE, EXTENDED RELEASE ORAL DAILY
COMMUNITY
Start: 2024-01-06

## 2024-01-15 RX ORDER — LOSARTAN POTASSIUM 25 MG/1
25 TABLET ORAL DAILY
Qty: 90 TABLET | Refills: 3 | Status: SHIPPED | OUTPATIENT
Start: 2024-01-15

## 2024-01-15 NOTE — PROGRESS NOTES
The ABCs of the Annual Wellness Visit  Subsequent Medicare Wellness Visit    Subjective      Ro Mejia is a 89 y.o. female who presents for a Subsequent Medicare Wellness Visit.    The following portions of the patient's history were reviewed and   updated as appropriate: allergies, current medications, past family history, past medical history, past social history, past surgical history, and problem list.    Compared to one year ago, the patient feels her physical   health is the same.    Compared to one year ago, the patient feels her mental   health is the same.    Recent Hospitalizations:  She was not admitted to the hospital during the last year.       Current Medical Providers:  Patient Care Team:  Bianca Reid MD as PCP - General (Family Medicine)  Hermilo Haddad MD as Consulting Physician (Cardiology)  Denise Singh MD as Consulting Physician (Pulmonary Disease)  Latanya Caldwell OD (Optometry)    Outpatient Medications Prior to Visit   Medication Sig Dispense Refill    albuterol sulfate  (90 Base) MCG/ACT inhaler Inhale 2 puffs Every 4 (Four) Hours As Needed for Wheezing. 8 g 0    baclofen (LIORESAL) 10 MG tablet Take 1 tablet by mouth 2 (Two) Times a Day As Needed for Muscle Spasms. 30 tablet 1    calcium carbonate (OS-CHHAYA) 600 MG tablet Take 1 tablet by mouth.      Cyanocobalamin (VITAMIN B-12 PO)   Maintenance, Refills: 0, Total Refills: 0, 07/08/18 18:10:20 EDT      Dilt- MG 24 hr capsule Take 1 capsule by mouth Daily.      dilTIAZem CD (CARDIZEM CD) 180 MG 24 hr capsule TAKE 1 CAPSULE BY MOUTH DAILY 90 capsule 3    hydrocortisone (ANUSOL-HC) 25 MG suppository Insert 1 suppository into the rectum 2 (Two) Times a Day. 10 suppository 0    Misc. Devices (Rollator Ultra-Light) misc 1 Device Daily. 1 each 0    multivitamin with minerals (VISION-JOSÉ PRESERVE PO) Take 1 tablet by mouth Daily.      potassium chloride (MICRO-K) 10 MEQ CR capsule Take 1 capsule by mouth 2 (Two)  Times a Day. 180 capsule 3    Pyridoxine HCl (VITAMIN B-6 PO)   Maintenance, Refills: 0, Total Refills: 0, 07/08/18 18:10:37 EDT      rivaroxaban (Xarelto) 15 MG tablet TAKE 1 TABLET BY MOUTH DAILY WITH DINNER 30 tablet 5    traMADol (ULTRAM) 50 MG tablet Take 1 tablet by mouth Every 12 (Twelve) Hours As Needed for Moderate Pain. 60 tablet 0    atorvastatin (LIPITOR) 10 MG tablet Take 1 tablet by mouth Every Night. 90 tablet 3    furosemide (LASIX) 20 MG tablet TAKE 1 TABLET BY MOUTH 4 DAYS A WEEK 48 tablet 3    losartan (COZAAR) 25 MG tablet Take 1 tablet by mouth Daily. 90 tablet 3    metOLazone (ZAROXOLYN) 2.5 MG tablet Take one on M, W, F 36 tablet 3     No facility-administered medications prior to visit.       Opioid medication/s are on active medication list.  and I have evaluated her active treatment plan and pain score trends (see table).  There were no vitals filed for this visit.  I have reviewed the chart for potential of high risk medication and harmful drug interactions in the elderly.          Aspirin is not on active medication list.  Aspirin use is not indicated based on review of current medical condition/s. Risk of harm outweighs potential benefits.  .    Patient Active Problem List   Diagnosis    A-fib    Family history of diabetes mellitus    Arteriosclerotic cerebrovascular disease    Family history of stroke    Essential hypertension    Neoplasm of uncertain behavior of skin    Hyperlipidemia    Impacted cerumen of right ear    Bronchitis    Medicare annual wellness visit, subsequent    Acute left ankle pain    Neck pain on right side    DDD (degenerative disc disease), cervical     Advance Care Planning   Advance Care Planning     Advance Directive is not on file.  ACP discussion was held with the patient during this visit. Patient does not have an advance directive, information provided.     Objective    Vitals:    01/15/24 0815 01/15/24 0839   BP: 144/82 134/82   BP Location: Right arm   "  Patient Position: Sitting    Cuff Size: Adult    Pulse: 69    Temp: 97.6 °F (36.4 °C)    TempSrc: Infrared    SpO2: 96%    Weight: 63.8 kg (140 lb 9.6 oz)    Height: 157.5 cm (62\")      Estimated body mass index is 25.72 kg/m² as calculated from the following:    Height as of this encounter: 157.5 cm (62\").    Weight as of this encounter: 63.8 kg (140 lb 9.6 oz).     Physical Exam  Vitals and nursing note reviewed. Exam conducted with a chaperone present.   Constitutional:       General: She is not in acute distress.     Appearance: She is well-developed.   HENT:      Head: Normocephalic.   Eyes:      General: Lids are normal.      Conjunctiva/sclera: Conjunctivae normal.   Neck:      Thyroid: No thyroid mass or thyromegaly.      Trachea: Trachea normal.   Cardiovascular:      Rate and Rhythm: Normal rate. Rhythm irregularly irregular.      Heart sounds: Normal heart sounds.   Pulmonary:      Effort: Pulmonary effort is normal.      Breath sounds: Normal breath sounds.   Musculoskeletal:      Cervical back: Normal range of motion.      Right lower leg: No edema.      Left lower leg: No edema.   Lymphadenopathy:      Cervical: No cervical adenopathy.   Skin:     General: Skin is warm and dry.   Neurological:      Mental Status: She is alert and oriented to person, place, and time.   Psychiatric:         Attention and Perception: She is attentive.         Mood and Affect: Mood normal.         Speech: Speech normal.         Behavior: Behavior normal.             Does the patient have evidence of cognitive impairment?   No            HEALTH RISK ASSESSMENT    Smoking Status:  Social History     Tobacco Use   Smoking Status Never    Passive exposure: Never   Smokeless Tobacco Never     Alcohol Consumption:  Social History     Substance and Sexual Activity   Alcohol Use Not Currently    Comment: caffeine occ.     Fall Risk Screen:    STEADI Fall Risk Assessment has not been completed.    Depression Screening:      " 1/15/2024     8:25 AM   PHQ-2/PHQ-9 Depression Screening   Little Interest or Pleasure in Doing Things 0-->not at all   Feeling Down, Depressed or Hopeless 0-->not at all   PHQ-9: Brief Depression Severity Measure Score 0       Health Habits and Functional and Cognitive Screenin/15/2024     8:00 AM   Functional & Cognitive Status   Do you have difficulty preparing food and eating? No   Do you have difficulty bathing yourself, getting dressed or grooming yourself? No   Do you have difficulty using the toilet? No   Do you have difficulty moving around from place to place? No   Do you have trouble with steps or getting out of a bed or a chair? No   Current Diet Well Balanced Diet   Dental Exam Up to date   Eye Exam Not up to date   Do you need help using the phone?  No   Are you deaf or do you have serious difficulty hearing?  No   Do you need help to go to places out of walking distance? No   Do you need help shopping? No   Do you need help preparing meals?  No   Do you need help with housework?  No   Do you need help with laundry? No   Do you need help taking your medications? No   Do you ever drive or ride in a car without wearing a seat belt? No   Have you felt unusual stress, anger or loneliness in the last month? No   Who do you live with? Child   If you need help, do you have trouble finding someone available to you? No   Do you have difficulty concentrating, remembering or making decisions? No       Age-appropriate Screening Schedule:  Refer to the list below for future screening recommendations based on patient's age, sex and/or medical conditions. Orders for these recommended tests are listed in the plan section. The patient has been provided with a written plan.    Health Maintenance   Topic Date Due    ZOSTER VACCINE (1 of 2) Never done    DXA SCAN  01/15/2024 (Originally 1/3/1931)    LIPID PANEL  2024    BMI FOLLOWUP  2024    ANNUAL WELLNESS VISIT  01/15/2025    TDAP/TD VACCINES (2 -  Td or Tdap) 02/19/2033    COVID-19 Vaccine  Completed    INFLUENZA VACCINE  Completed    Pneumococcal Vaccine 65+  Completed                  CMS Preventative Services Quick Reference  Risk Factors Identified During Encounter:    Inactivity/Sedentary: Patient was advised to exercise at least 150 minutes a week per CDC recommendations.  Dental Screening Recommended  Vision Screening Recommended    The above risks/problems have been discussed with the patient.  Pertinent information has been shared with the patient in the After Visit Summary.    Diagnoses and all orders for this visit:    1. Medicare annual wellness visit, subsequent (Primary)    2. Essential hypertension  Assessment & Plan:  Hypertension is unchanged.  Continue current treatment regimen.  Blood pressure will be reassessed at the next regular appointment.    Orders:  -     metOLazone (ZAROXOLYN) 2.5 MG tablet; Take one on M, W, F  Dispense: 36 tablet; Refill: 3  -     losartan (COZAAR) 25 MG tablet; Take 1 tablet by mouth Daily.  Dispense: 90 tablet; Refill: 3  -     Lipid Panel  -     Comprehensive Metabolic Panel    3. Hyperlipidemia, unspecified hyperlipidemia type  Assessment & Plan:  Lipid abnormalities are unchanged.  Nutritional counseling was provided. and Pharmacotherapy as ordered.  Lipids will be reassessed in 6 months.    Orders:  -     Lipid Panel  -     Comprehensive Metabolic Panel    Other orders  -     furosemide (LASIX) 20 MG tablet; TAKE 1 TABLET BY MOUTH 4 DAYS A WEEK  Dispense: 48 tablet; Refill: 3  -     atorvastatin (LIPITOR) 10 MG tablet; Take 1 tablet by mouth Every Night.  Dispense: 90 tablet; Refill: 3        Follow Up:   Next Medicare Wellness visit to be scheduled in 1 year.      An After Visit Summary and PPPS were made available to the patient.

## 2024-01-17 NOTE — PROGRESS NOTES
Patient notified via voicemail. Advised to call back if she has any questions or concerns on 1/17 @ 10:25AM.

## 2024-01-18 ENCOUNTER — TELEPHONE (OUTPATIENT)
Dept: FAMILY MEDICINE CLINIC | Facility: CLINIC | Age: 89
End: 2024-01-18
Payer: MEDICARE

## 2024-01-18 NOTE — TELEPHONE ENCOUNTER
Caller: LESLIE DOUGLAS    Relationship: Emergency Contact    Best call back number: [phone]     What is the best time to reach you: ANYTIME    Who are you requesting to speak with (clinical staff, provider,  specific staff member): CLINICAL    What was the call regarding: TEST RESULTS AND TROUBLE WITH DIZZINESS WHEN LYING DOWN AND PATIENT BLACKED OUT WHILE SITTING ON TOILET AND WOKE UP ON BATHROOM FLOOR ON 01/17/24. WAS NOT AWARE SHE WAS FALLING. NO INJURIES FROM THE FALL. HAVING MULTIPLE DIZZY SPELLS THROUGHOUT THE DAY, PATIENT SAYS HER HEAD IS JUST SPINNING, WON'T SAY THAT SHE IS DIZZY, NEVER HAD VERTIGO THAT SHE KNOWS OF.     PLEASE CALL TO ADVISE ON NEXT STEPS.     Is it okay if the provider responds through MyChart: PATIENT DOES NOT HAVE MYCHART

## 2024-01-18 NOTE — TELEPHONE ENCOUNTER
I spoke Phyllis she stated that this start last night, the patients BP was 100/40 - I advised that if this continues she should take Ro to the ER     She has not taken shirleyians BP today but will call back with readings and if this continues she wanted your advice on this

## 2024-01-19 NOTE — TELEPHONE ENCOUNTER
Please call and check on her to see how she is doing today.  I looked at her medicine list and she has both diltiazem (cardizem) 180 mg and Dilt- mg listed.  These are the same thing.  Is she taking both?

## 2024-02-21 RX ORDER — FUROSEMIDE 20 MG/1
TABLET ORAL
Qty: 48 TABLET | Refills: 3 | Status: SHIPPED | OUTPATIENT
Start: 2024-02-21

## 2024-03-12 ENCOUNTER — TELEPHONE (OUTPATIENT)
Dept: FAMILY MEDICINE CLINIC | Facility: CLINIC | Age: 89
End: 2024-03-12

## 2024-03-12 NOTE — TELEPHONE ENCOUNTER
Caller: LESLIE DOUGLAS    Relationship to patient: Emergency Contact    Best call back number:     LESLIE DOUGLAS () [phone] (Mobile)       New or established patient?  [] New    [x] Established    Date of discharge:  THIS UPCOMING WEEKEND       Facility discharged from:   [facility]   [facility]     Diagnosis/Symptoms: BROKE HER HIP     Length of stay (If applicable): JANUARY   TO THIS WEEKEND     APPT SCHEDULED WITH PCP FOR 3-22-24     IF SHE NEEDS TO BE SEEN SOONER, PLEASE CALL AND ADVISE       Scan on 1/22/2024 1759 by Bianca Reid MD: MRI CERVICAL JENAA, [facility], 01/22/2024

## 2024-03-15 ENCOUNTER — TELEPHONE (OUTPATIENT)
Dept: FAMILY MEDICINE CLINIC | Facility: CLINIC | Age: 89
End: 2024-03-15
Payer: MEDICARE

## 2024-03-15 ENCOUNTER — READMISSION MANAGEMENT (OUTPATIENT)
Dept: CALL CENTER | Facility: HOSPITAL | Age: 89
End: 2024-03-15
Payer: MEDICARE

## 2024-03-18 ENCOUNTER — TRANSITIONAL CARE MANAGEMENT TELEPHONE ENCOUNTER (OUTPATIENT)
Dept: CALL CENTER | Facility: HOSPITAL | Age: 89
End: 2024-03-18
Payer: MEDICARE

## 2024-03-18 NOTE — OUTREACH NOTE
Call Center TCM Note      Flowsheet Row Responses   [facility] patient discharged from? Non-   Does the patient have one of the following disease processes/diagnoses(primary or secondary)? Other   TCM attempt successful? Yes   Call start time 0947   Call end time 0948   Discharge diagnosis Fracture of unspecified part of neck of left femur   Is patient permission given to speak with other caregiver? Yes   List who call center can speak with Daughter   Person spoke with today (if not patient) and relationship Daughter   Meds reviewed with patient/caregiver? Yes   Is the patient having any side effects they believe may be caused by any medication additions or changes? No   Does the patient have all medications ordered at discharge? Yes   Is the patient taking all medications as directed (includes completed medication regime)? Yes   Comments Hosp dc fu apt on 3/22/24.   Does the patient have an appointment with their PCP within 7-14 days of discharge? Yes   What is the Home health agency?  [facility]   Has home health visited the patient within 72 hours of discharge? Yes   Home health comments  nurse coming today (3/18/24)   Did the patient receive a copy of their discharge instructions? Yes   Nursing interventions Reviewed instructions with patient   What is the patient's perception of their health status since discharge? Improving   Is the patient/caregiver able to teach back signs and symptoms related to disease process for when to call PCP? Yes   Is the patient/caregiver able to teach back signs and symptoms related to disease process for when to call 911? Yes   Is the patient/caregiver able to teach back the hierarchy of who to call/visit for symptoms/problems? PCP, Specialist, Home health nurse, Urgent Care, ED, 911 Yes   If the patient is a current smoker, are they able to teach back resources for cessation? Not a smoker   TCM call completed? Yes   Call end time 0948            Aline  GEOVANI Smith RN    3/18/2024, 09:49 EDT

## 2024-03-22 ENCOUNTER — OFFICE VISIT (OUTPATIENT)
Dept: FAMILY MEDICINE CLINIC | Facility: CLINIC | Age: 89
End: 2024-03-22
Payer: MEDICARE

## 2024-03-22 VITALS
OXYGEN SATURATION: 98 % | SYSTOLIC BLOOD PRESSURE: 132 MMHG | BODY MASS INDEX: 23.65 KG/M2 | TEMPERATURE: 97.4 F | HEART RATE: 90 BPM | WEIGHT: 128.5 LBS | HEIGHT: 62 IN | DIASTOLIC BLOOD PRESSURE: 64 MMHG

## 2024-03-22 DIAGNOSIS — M54.2 NECK PAIN ON RIGHT SIDE: ICD-10-CM

## 2024-03-22 DIAGNOSIS — R06.02 SHORTNESS OF BREATH: ICD-10-CM

## 2024-03-22 DIAGNOSIS — R42 DIZZINESS: ICD-10-CM

## 2024-03-22 DIAGNOSIS — F32.9 REACTIVE DEPRESSION: ICD-10-CM

## 2024-03-22 DIAGNOSIS — S72.002D CLOSED FRACTURE OF LEFT HIP WITH ROUTINE HEALING, SUBSEQUENT ENCOUNTER: Primary | ICD-10-CM

## 2024-03-22 PROBLEM — S72.002A CLOSED FRACTURE OF LEFT HIP: Status: ACTIVE | Noted: 2024-03-22

## 2024-03-22 RX ORDER — DULOXETIN HYDROCHLORIDE 30 MG/1
30 CAPSULE, DELAYED RELEASE ORAL DAILY
Qty: 90 CAPSULE | Refills: 1 | Status: SHIPPED | OUTPATIENT
Start: 2024-03-22

## 2024-03-22 RX ORDER — ACETAMINOPHEN 325 MG/1
TABLET ORAL
COMMUNITY
Start: 2024-01-22

## 2024-03-22 RX ORDER — HYDROCODONE BITARTRATE AND ACETAMINOPHEN 5; 325 MG/1; MG/1
TABLET ORAL
COMMUNITY
Start: 2024-01-22 | End: 2024-03-22

## 2024-03-22 RX ORDER — ALBUTEROL SULFATE 90 UG/1
2 AEROSOL, METERED RESPIRATORY (INHALATION) EVERY 4 HOURS PRN
Qty: 18 G | Refills: 1 | Status: SHIPPED | OUTPATIENT
Start: 2024-03-22

## 2024-03-22 RX ORDER — MECLIZINE HYDROCHLORIDE 25 MG/1
TABLET ORAL
COMMUNITY
Start: 2024-01-22

## 2024-03-22 RX ORDER — GINGER ROOT/GINGER ROOT EXT 262.5 MG
CAPSULE ORAL
COMMUNITY

## 2024-03-22 RX ORDER — LIDOCAINE 50 MG/G
1 PATCH TOPICAL EVERY 24 HOURS
COMMUNITY
End: 2024-03-22 | Stop reason: SDUPTHER

## 2024-03-22 RX ORDER — LIDOCAINE 50 MG/G
1 PATCH TOPICAL EVERY 24 HOURS
Qty: 30 PATCH | Refills: 2 | Status: SHIPPED | OUTPATIENT
Start: 2024-03-22

## 2024-03-22 NOTE — ASSESSMENT & PLAN NOTE
She has had no further episodes since being home.  If symptoms return she is willing to do vestibular rehab.

## 2024-03-22 NOTE — PROGRESS NOTES
Transitional Care Follow Up Visit  Subjective     Ro Mejia is a 89 y.o. female who presents for a transitional care management visit     I reviewed and discussed the details of that call along with the discharge summary, hospital problems, inpatient lab results, inpatient diagnostic studies, and consultation reports with Ro.     Current outpatient and discharge medications have been reconciled for the patient.  Reviewed by: Bianca Reid MD          3/15/2024     1:52 PM   Date of TCM Phone Call   [facility]   Date of Discharge 3/15/2024     Risk for Readmission (LACE) No data recorded    History of Present Illness   Course During Hospital Stay:  She had been feeling dizzy at home on 1/19/24 and then had to the bathroom and she fell in the bathroom.  No LOC.  She had left hip pain.  Called 911 and was taken to the ER where she was diagnosed with a left hip fracture.  She had left hip replacement with Dr. Farr.  She was in the hospital for about 3 days and then went to rehab at [facility] for 18 days and then went to [facility] for about 5-6 weeks.  She had a UTI and a URI while she was there.  She is now back to [facility] with her daughter in law.  She has had a pressure ulcer on her right heel but it is improving.  She is doing home physical therapy with home health. She has lost about 12 pounds.  She does not have much appetite and her mood is flat.      The following portions of the patient's history were reviewed and updated as appropriate: allergies, current medications, past family history, past medical history, past social history, past surgical history, and problem list.    Review of Systems   Constitutional:  Positive for fatigue and unexpected weight change. Negative for fever.   Respiratory:  Positive for shortness of breath.    Neurological:  Positive for dizziness.       Objective   Physical Exam  Vitals and nursing note reviewed. Exam conducted with a  chaperone present.   Constitutional:       General: She is not in acute distress.     Appearance: She is well-developed.      Comments: Walking slowly with a walker   HENT:      Head: Normocephalic.   Eyes:      General: Lids are normal.      Conjunctiva/sclera: Conjunctivae normal.   Neck:      Thyroid: No thyroid mass or thyromegaly.      Trachea: Trachea normal.   Cardiovascular:      Rate and Rhythm: Normal rate. Rhythm irregularly irregular.      Heart sounds: Normal heart sounds.   Pulmonary:      Effort: Pulmonary effort is normal.      Breath sounds: Normal breath sounds.   Musculoskeletal:      Cervical back: Normal range of motion.   Lymphadenopathy:      Cervical: No cervical adenopathy.   Skin:     General: Skin is warm and dry.   Neurological:      Mental Status: She is alert and oriented to person, place, and time.      Gait: Gait abnormal.   Psychiatric:         Attention and Perception: She is attentive.         Mood and Affect: Affect is flat.         Speech: Speech normal.         Behavior: Behavior normal.         Assessment & Plan   Diagnoses and all orders for this visit:    1. Closed fracture of left hip with routine healing, subsequent encounter (Primary)  Assessment & Plan:  S/P surgery by Dr. Farr at [facility].  She is healing well.       2. Dizziness  Assessment & Plan:  She has had no further episodes since being home.  If symptoms return she is willing to do vestibular rehab.      3. Shortness of breath  Assessment & Plan:  Albuterol as prescribed.      Orders:  -     albuterol sulfate  (90 Base) MCG/ACT inhaler; Inhale 2 puffs Every 4 (Four) Hours As Needed for Wheezing.  Dispense: 18 g; Refill: 1    4. Neck pain on right side  Assessment & Plan:  Better with Lidocaine patch and Tylenol prn.    Orders:  -     lidocaine (LIDODERM) 5 %; Place 1 patch on the skin as directed by provider Daily. Remove & Discard patch within 12 hours or as directed by MD  Dispense: 30  patch; Refill: 2    5. Reactive depression  Assessment & Plan:  Patient's depression is a single episode that is moderate without psychosis. Depression is active and newly identified.    Plan:   Begin new antidepressant medicine; cymbalta    Followup at the next regular appointment.     Orders:  -     DULoxetine (Cymbalta) 30 MG capsule; Take 1 capsule by mouth Daily.  Dispense: 90 capsule; Refill: 1

## 2024-03-22 NOTE — ASSESSMENT & PLAN NOTE
Patient's depression is a single episode that is moderate without psychosis. Depression is active and newly identified.    Plan:   Begin new antidepressant medicine; cymbalta    Followup at the next regular appointment.

## 2024-03-25 RX ORDER — ATORVASTATIN CALCIUM 10 MG/1
10 TABLET, FILM COATED ORAL NIGHTLY
Qty: 90 TABLET | Refills: 3 | Status: SHIPPED | OUTPATIENT
Start: 2024-03-25

## 2024-04-09 RX ORDER — FUROSEMIDE 20 MG/1
TABLET ORAL
Qty: 48 TABLET | Refills: 3 | Status: SHIPPED | OUTPATIENT
Start: 2024-04-09

## 2024-04-09 NOTE — TELEPHONE ENCOUNTER
Caller: LESLIE DOUGLAS    Relationship: Emergency Contact    Best call back number: [phone]    Requested Prescriptions:   Requested Prescriptions     Pending Prescriptions Disp Refills    furosemide (LASIX) 20 MG tablet 48 tablet 3     Sig: TAKE 1 TABLET BY MOUTH 4 DAYS A WEEK        Pharmacy where request should be sent: [id number] [address] AT [place] [address] & [facility] - [phone]  - [phone] FX     Last office visit with prescribing clinician: 3/22/2024   Last telemedicine visit with prescribing clinician: Visit date not found   Next office visit with prescribing clinician: 5/20/2024     Does the patient have less than a 3 day supply:  [] Yes  [x] No      Neil Downing Rep   04/09/24 08:20 EDT

## 2024-04-23 ENCOUNTER — OUTSIDE FACILITY SERVICE (OUTPATIENT)
Dept: FAMILY MEDICINE CLINIC | Facility: CLINIC | Age: 89
End: 2024-04-23
Payer: MEDICARE

## 2024-04-23 PROCEDURE — G0180 MD CERTIFICATION HHA PATIENT: HCPCS | Performed by: FAMILY MEDICINE

## 2024-04-27 DIAGNOSIS — R06.02 SHORTNESS OF BREATH: ICD-10-CM

## 2024-04-29 RX ORDER — ALBUTEROL SULFATE 90 UG/1
2 AEROSOL, METERED RESPIRATORY (INHALATION) EVERY 4 HOURS PRN
Qty: 8.5 G | Refills: 3 | Status: SHIPPED | OUTPATIENT
Start: 2024-04-29

## 2024-06-03 ENCOUNTER — OFFICE VISIT (OUTPATIENT)
Dept: FAMILY MEDICINE CLINIC | Facility: CLINIC | Age: 89
End: 2024-06-03
Payer: MEDICARE

## 2024-06-03 VITALS
HEIGHT: 62 IN | SYSTOLIC BLOOD PRESSURE: 119 MMHG | OXYGEN SATURATION: 94 % | TEMPERATURE: 98.7 F | DIASTOLIC BLOOD PRESSURE: 62 MMHG | BODY MASS INDEX: 24.55 KG/M2 | WEIGHT: 133.4 LBS | HEART RATE: 61 BPM

## 2024-06-03 DIAGNOSIS — I10 ESSENTIAL HYPERTENSION: Primary | ICD-10-CM

## 2024-06-03 PROCEDURE — G2211 COMPLEX E/M VISIT ADD ON: HCPCS | Performed by: FAMILY MEDICINE

## 2024-06-03 PROCEDURE — 1159F MED LIST DOCD IN RCRD: CPT | Performed by: FAMILY MEDICINE

## 2024-06-03 PROCEDURE — 99213 OFFICE O/P EST LOW 20 MIN: CPT | Performed by: FAMILY MEDICINE

## 2024-06-03 PROCEDURE — 1160F RVW MEDS BY RX/DR IN RCRD: CPT | Performed by: FAMILY MEDICINE

## 2024-06-03 RX ORDER — LOSARTAN POTASSIUM 25 MG/1
25 TABLET ORAL DAILY
Start: 2024-06-03

## 2024-06-03 NOTE — PROGRESS NOTES
"Chief Complaint  Follow-up (2 month )    Subjective        Ro Mejia presents to [facility]  Hypertension  This is a chronic problem. The current episode started more than 1 year ago. The problem has been improved since onset. The problem is controlled. Pertinent negatives include no anxiety, blurred vision, chest pain, peripheral edema or shortness of breath. There are no associated agents to hypertension. The current treatment provides moderate improvement. There are no compliance problems.        Objective   Vital Signs:  /62 (BP Location: Left arm, Patient Position: Sitting, Cuff Size: Adult)   Pulse 61   Temp 98.7 °F (37.1 °C) (Infrared)   Ht 157.5 cm (62\")   Wt 60.5 kg (133 lb 6.4 oz)   SpO2 94%   BMI 24.40 kg/m²   Estimated body mass index is 24.4 kg/m² as calculated from the following:    Height as of this encounter: 157.5 cm (62\").    Weight as of this encounter: 60.5 kg (133 lb 6.4 oz).       BMI is within normal parameters. No other follow-up for BMI required.      Physical Exam  Vitals and nursing note reviewed.   Constitutional:       General: She is not in acute distress.     Appearance: She is well-developed.   HENT:      Head: Normocephalic.   Eyes:      General: Lids are normal.      Conjunctiva/sclera: Conjunctivae normal.   Neck:      Thyroid: No thyroid mass or thyromegaly.      Trachea: Trachea normal.   Cardiovascular:      Rate and Rhythm: Normal rate and regular rhythm.      Heart sounds: Normal heart sounds.   Pulmonary:      Effort: Pulmonary effort is normal.      Breath sounds: Normal breath sounds.   Musculoskeletal:      Cervical back: Normal range of motion.      Right lower leg: No edema.      Left lower leg: No edema.   Lymphadenopathy:      Cervical: No cervical adenopathy.   Skin:     General: Skin is warm and dry.   Neurological:      Mental Status: She is alert and oriented to person, place, and time.   Psychiatric:         " Attention and Perception: She is attentive.         Mood and Affect: Mood normal.         Speech: Speech normal.         Behavior: Behavior normal.        Result Review :    The following data was reviewed by: Bianca Reid MD on 06/03/2024:  Common labs          7/11/2023    09:13 1/15/2024    08:39   Common Labs   Glucose 104  90    BUN 19  20    Creatinine 1.29  1.43    Sodium 139  142    Potassium 3.2  3.7    Chloride 98  101    Calcium 10.1  10.1    Albumin 4.3  4.6    Total Bilirubin 1.0  0.5    Alkaline Phosphatase 70  53    AST (SGOT) 17  14    ALT (SGPT) 9  14    WBC 8.72     Hemoglobin 12.9     Hematocrit 38.7     Platelets 238     Total Cholesterol 130  135    Triglycerides 51  73    HDL Cholesterol 68  66    LDL Cholesterol  50  54                   Assessment and Plan     Diagnoses and all orders for this visit:    1. Essential hypertension (Primary)  Assessment & Plan:  Hypertension is stable and controlled  Continue current treatment regimen.  Blood pressure will be reassessed in 6 months.    Orders:  -     losartan (COZAAR) 25 MG tablet; Take 1 tablet by mouth Daily.             Follow Up     Return in about 6 months (around 12/3/2024) for Recheck.  Patient was given instructions and counseling regarding her condition or for health maintenance advice. Please see specific information pulled into the AVS if appropriate.

## 2024-06-21 DIAGNOSIS — I10 ESSENTIAL HYPERTENSION: ICD-10-CM

## 2024-06-21 RX ORDER — METOLAZONE 2.5 MG/1
TABLET ORAL
Qty: 36 TABLET | Refills: 3 | Status: SHIPPED | OUTPATIENT
Start: 2024-06-21

## 2024-09-16 RX ORDER — POTASSIUM CHLORIDE 750 MG/1
10 CAPSULE, EXTENDED RELEASE ORAL 2 TIMES DAILY
Qty: 180 CAPSULE | Refills: 3 | Status: SHIPPED | OUTPATIENT
Start: 2024-09-16

## 2024-09-16 RX ORDER — RIVAROXABAN 15 MG/1
15 TABLET, FILM COATED ORAL
Qty: 90 TABLET | Refills: 1 | Status: SHIPPED | OUTPATIENT
Start: 2024-09-16

## 2024-12-02 ENCOUNTER — OFFICE VISIT (OUTPATIENT)
Dept: FAMILY MEDICINE CLINIC | Facility: CLINIC | Age: 89
End: 2024-12-02
Payer: MEDICARE

## 2024-12-02 VITALS
HEART RATE: 86 BPM | WEIGHT: 138.2 LBS | TEMPERATURE: 98 F | SYSTOLIC BLOOD PRESSURE: 142 MMHG | BODY MASS INDEX: 25.28 KG/M2 | DIASTOLIC BLOOD PRESSURE: 78 MMHG | OXYGEN SATURATION: 96 %

## 2024-12-02 DIAGNOSIS — H61.23 BILATERAL IMPACTED CERUMEN: ICD-10-CM

## 2024-12-02 DIAGNOSIS — I10 ESSENTIAL HYPERTENSION: Primary | ICD-10-CM

## 2024-12-02 PROCEDURE — 99213 OFFICE O/P EST LOW 20 MIN: CPT | Performed by: FAMILY MEDICINE

## 2024-12-02 PROCEDURE — 1159F MED LIST DOCD IN RCRD: CPT | Performed by: FAMILY MEDICINE

## 2024-12-02 PROCEDURE — 69209 REMOVE IMPACTED EAR WAX UNI: CPT | Performed by: FAMILY MEDICINE

## 2024-12-02 PROCEDURE — 1160F RVW MEDS BY RX/DR IN RCRD: CPT | Performed by: FAMILY MEDICINE

## 2024-12-02 RX ORDER — ATORVASTATIN CALCIUM 10 MG/1
10 TABLET, FILM COATED ORAL NIGHTLY
Qty: 90 TABLET | Refills: 3 | Status: SHIPPED | OUTPATIENT
Start: 2024-12-02

## 2024-12-02 RX ORDER — FUROSEMIDE 20 MG/1
TABLET ORAL
Qty: 48 TABLET | Refills: 3 | Status: SHIPPED | OUTPATIENT
Start: 2024-12-02

## 2024-12-02 RX ORDER — LOSARTAN POTASSIUM 25 MG/1
25 TABLET ORAL DAILY
Qty: 90 TABLET | Refills: 3 | Status: SHIPPED | OUTPATIENT
Start: 2024-12-02

## 2024-12-02 NOTE — PROGRESS NOTES
"Chief Complaint  Hypertension    Subjective        Ro Mejia presents to [facility]  Hypertension  This is a chronic problem. The current episode started more than 1 year ago. The problem is unchanged. The problem is controlled. Associated symptoms include blurred vision. Pertinent negatives include no anxiety, chest pain, headaches, malaise/fatigue, neck pain, palpitations, peripheral edema or shortness of breath. There are no associated agents to hypertension. The current treatment provides moderate improvement. There are no compliance problems.        Objective   Vital Signs:  /78 (BP Location: Left arm, Patient Position: Sitting, Cuff Size: Adult)   Pulse 86   Temp 98 °F (36.7 °C) (Infrared)   Wt 62.7 kg (138 lb 3.2 oz)   SpO2 96%   BMI 25.28 kg/m²   Estimated body mass index is 25.28 kg/m² as calculated from the following:    Height as of 6/3/24: 157.5 cm (62\").    Weight as of this encounter: 62.7 kg (138 lb 3.2 oz).    BMI is >= 25 and <30. (Overweight) The following options were offered after discussion;: exercise counseling/recommendations      Physical Exam  Vitals and nursing note reviewed.   Constitutional:       General: She is not in acute distress.     Appearance: She is well-developed.   HENT:      Head: Normocephalic.      Right Ear: There is impacted cerumen.      Left Ear: There is impacted cerumen.   Eyes:      General: Lids are normal.      Conjunctiva/sclera: Conjunctivae normal.   Neck:      Thyroid: No thyroid mass or thyromegaly.      Trachea: Trachea normal.   Cardiovascular:      Rate and Rhythm: Normal rate and regular rhythm.      Heart sounds: Normal heart sounds.   Pulmonary:      Effort: Pulmonary effort is normal.      Breath sounds: Normal breath sounds.   Musculoskeletal:      Cervical back: Normal range of motion.   Lymphadenopathy:      Cervical: No cervical adenopathy.   Skin:     General: Skin is warm and dry.   Neurological: "      Mental Status: She is alert and oriented to person, place, and time.   Psychiatric:         Attention and Perception: She is attentive.         Mood and Affect: Mood normal.         Speech: Speech normal.         Behavior: Behavior normal.        Result Review :  The following data was reviewed by: Bianca Reid MD on 12/02/2024:  Common labs          1/15/2024    08:39   Common Labs   Glucose 90    BUN 20    Creatinine 1.43    Sodium 142    Potassium 3.7    Chloride 101    Calcium 10.1    Albumin 4.6    Total Bilirubin 0.5    Alkaline Phosphatase 53    AST (SGOT) 14    ALT (SGPT) 14    Total Cholesterol 135    Triglycerides 73    HDL Cholesterol 66    LDL Cholesterol  54         Ear Cerumen Removal    Date/Time: 12/2/2024 10:27 AM    Performed by: Bianca Reid MD  Authorized by: Bianca Reid MD    Anesthesia:  Local Anesthetic: none  Ceruminolytics applied: Ceruminolytics applied prior to the procedure.  Location details: left ear and right ear  Patient tolerance: patient tolerated the procedure well with no immediate complications  Procedure type: irrigation   Sedation:  Patient sedated: no              Assessment and Plan   Diagnoses and all orders for this visit:    1. Essential hypertension (Primary)  Assessment & Plan:  Hypertension is stable and controlled  Continue current treatment regimen.  Blood pressure will be reassessed in 6 months.    Orders:  -     losartan (COZAAR) 25 MG tablet; Take 1 tablet by mouth Daily.  Dispense: 90 tablet; Refill: 3    2. Bilateral impacted cerumen  -     Ear Cerumen Removal    Other orders  -     furosemide (LASIX) 20 MG tablet; TAKE 1 TABLET BY MOUTH 4 DAYS A WEEK  Dispense: 48 tablet; Refill: 3  -     atorvastatin (LIPITOR) 10 MG tablet; Take 1 tablet by mouth Every Night.  Dispense: 90 tablet; Refill: 3             Follow Up   No follow-ups on file.  Patient was given instructions and counseling regarding her condition or for health maintenance  advice. Please see specific information pulled into the AVS if appropriate.

## 2024-12-16 DIAGNOSIS — I10 ESSENTIAL HYPERTENSION: ICD-10-CM

## 2024-12-16 RX ORDER — LOSARTAN POTASSIUM 25 MG/1
25 TABLET ORAL DAILY
Qty: 90 TABLET | Refills: 3 | Status: SHIPPED | OUTPATIENT
Start: 2024-12-16

## 2024-12-21 DIAGNOSIS — I10 ESSENTIAL HYPERTENSION: ICD-10-CM

## 2024-12-23 RX ORDER — DILTIAZEM HYDROCHLORIDE 180 MG/1
180 CAPSULE, COATED, EXTENDED RELEASE ORAL DAILY
Qty: 90 CAPSULE | Refills: 3 | Status: SHIPPED | OUTPATIENT
Start: 2024-12-23

## 2025-01-13 ENCOUNTER — TELEPHONE (OUTPATIENT)
Dept: FAMILY MEDICINE CLINIC | Facility: CLINIC | Age: OVER 89
End: 2025-01-13

## 2025-01-13 RX ORDER — BACLOFEN 10 MG/1
10 TABLET ORAL 2 TIMES DAILY PRN
Qty: 30 TABLET | Refills: 1 | Status: SHIPPED | OUTPATIENT
Start: 2025-01-13 | End: 2025-01-31

## 2025-01-13 NOTE — TELEPHONE ENCOUNTER
Caller: LESLIE DOUGLAS    Relationship to patient: Emergency Contact    Best call back number:      [phone]        Patient is needing:     PATIENT'S DAUGHTER IN LAW CALLED IN STATING PATIENT HAS HAD A COLD.     PATIENT COUGHED HARD ON 1.9.25 AND LESLIE BELIEVES SHE MAY HAVE PULLED A MUSCLE BECAUSE HER BACK AND STOMACH ARE IN PAIN.     PLEASE CHHAYA NELSON TO DISCUSS.

## 2025-01-13 NOTE — TELEPHONE ENCOUNTER
Tylenol 1-2 tabs po q4h prn  I can send in a prescription for a muscle relaxer to try also.   She could also try over the counter lidocaine patches like Salonpas.

## 2025-01-14 NOTE — TELEPHONE ENCOUNTER
Spoke with JOSEPH Little and told her what Dr. Reid said, she said they have tried the lidocaine patches but will try again.

## 2025-01-16 DIAGNOSIS — R06.02 SHORTNESS OF BREATH: ICD-10-CM

## 2025-01-17 RX ORDER — ALBUTEROL SULFATE 90 UG/1
2 INHALANT RESPIRATORY (INHALATION) EVERY 4 HOURS PRN
Qty: 8.5 G | Refills: 3 | Status: SHIPPED | OUTPATIENT
Start: 2025-01-17

## 2025-01-31 ENCOUNTER — OFFICE VISIT (OUTPATIENT)
Dept: FAMILY MEDICINE CLINIC | Facility: CLINIC | Age: OVER 89
End: 2025-01-31
Payer: MEDICARE

## 2025-01-31 ENCOUNTER — LAB (OUTPATIENT)
Dept: FAMILY MEDICINE CLINIC | Facility: CLINIC | Age: OVER 89
End: 2025-01-31
Payer: MEDICARE

## 2025-01-31 VITALS
DIASTOLIC BLOOD PRESSURE: 63 MMHG | RESPIRATION RATE: 18 BRPM | HEART RATE: 92 BPM | HEIGHT: 62 IN | SYSTOLIC BLOOD PRESSURE: 126 MMHG | OXYGEN SATURATION: 97 % | TEMPERATURE: 97.7 F | WEIGHT: 137.8 LBS | BODY MASS INDEX: 25.36 KG/M2

## 2025-01-31 DIAGNOSIS — F41.9 ANXIETY: ICD-10-CM

## 2025-01-31 DIAGNOSIS — N18.9 CHRONIC RENAL IMPAIRMENT, UNSPECIFIED CKD STAGE: ICD-10-CM

## 2025-01-31 DIAGNOSIS — J40 BRONCHITIS: ICD-10-CM

## 2025-01-31 DIAGNOSIS — S32.050D COMPRESSION FRACTURE OF L5 VERTEBRA WITH ROUTINE HEALING, SUBSEQUENT ENCOUNTER: Primary | ICD-10-CM

## 2025-01-31 DIAGNOSIS — K52.9 COLITIS: ICD-10-CM

## 2025-01-31 PROBLEM — S32.000D COMPRESSION FRACTURE OF LUMBAR VERTEBRA WITH ROUTINE HEALING: Status: ACTIVE | Noted: 2025-01-31

## 2025-01-31 LAB
ALBUMIN SERPL-MCNC: 3.8 G/DL (ref 3.5–5.2)
ALBUMIN/GLOB SERPL: 1.1 G/DL
ALP SERPL-CCNC: 105 U/L (ref 39–117)
ALT SERPL W P-5'-P-CCNC: 9 U/L (ref 1–33)
ANION GAP SERPL CALCULATED.3IONS-SCNC: 10.5 MMOL/L (ref 5–15)
AST SERPL-CCNC: 19 U/L (ref 1–32)
BILIRUB SERPL-MCNC: 0.4 MG/DL (ref 0–1.2)
BUN SERPL-MCNC: 23 MG/DL (ref 8–23)
BUN/CREAT SERPL: 15.6 (ref 7–25)
CALCIUM SPEC-SCNC: 9.9 MG/DL (ref 8.2–9.6)
CHLORIDE SERPL-SCNC: 103 MMOL/L (ref 98–107)
CHOLEST SERPL-MCNC: 140 MG/DL (ref 0–200)
CO2 SERPL-SCNC: 27.5 MMOL/L (ref 22–29)
CREAT SERPL-MCNC: 1.47 MG/DL (ref 0.57–1)
EGFRCR SERPLBLD CKD-EPI 2021: 32.9 ML/MIN/1.73
GLOBULIN UR ELPH-MCNC: 3.6 GM/DL
GLUCOSE SERPL-MCNC: 114 MG/DL (ref 65–99)
HDLC SERPL-MCNC: 53 MG/DL (ref 40–60)
LDLC SERPL CALC-MCNC: 76 MG/DL (ref 0–100)
LDLC/HDLC SERPL: 1.45 {RATIO}
POTASSIUM SERPL-SCNC: 4.1 MMOL/L (ref 3.5–5.2)
PROT SERPL-MCNC: 7.4 G/DL (ref 6–8.5)
SODIUM SERPL-SCNC: 141 MMOL/L (ref 136–145)
TRIGL SERPL-MCNC: 50 MG/DL (ref 0–150)
VLDLC SERPL-MCNC: 11 MG/DL (ref 5–40)

## 2025-01-31 PROCEDURE — 80061 LIPID PANEL: CPT | Performed by: FAMILY MEDICINE

## 2025-01-31 PROCEDURE — 36415 COLL VENOUS BLD VENIPUNCTURE: CPT | Performed by: FAMILY MEDICINE

## 2025-01-31 PROCEDURE — G2211 COMPLEX E/M VISIT ADD ON: HCPCS | Performed by: FAMILY MEDICINE

## 2025-01-31 PROCEDURE — 99214 OFFICE O/P EST MOD 30 MIN: CPT | Performed by: FAMILY MEDICINE

## 2025-01-31 PROCEDURE — 80053 COMPREHEN METABOLIC PANEL: CPT | Performed by: FAMILY MEDICINE

## 2025-01-31 RX ORDER — ALPRAZOLAM 0.25 MG/1
0.25 TABLET ORAL 2 TIMES DAILY PRN
Qty: 30 TABLET | Refills: 0 | Status: SHIPPED | OUTPATIENT
Start: 2025-01-31

## 2025-01-31 RX ORDER — IPRATROPIUM BROMIDE AND ALBUTEROL SULFATE 2.5; .5 MG/3ML; MG/3ML
3 SOLUTION RESPIRATORY (INHALATION) EVERY 4 HOURS PRN
COMMUNITY

## 2025-01-31 RX ORDER — HYDROCODONE BITARTRATE AND ACETAMINOPHEN 5; 325 MG/1; MG/1
1 TABLET ORAL EVERY 6 HOURS PRN
COMMUNITY

## 2025-01-31 NOTE — PROGRESS NOTES
"Chief Complaint  Follow-up    Subjective        Ro Mejia presents to [facility]  History of Present Illness  She was recently in the hospital at [facility] and then went to rehab at [facility].  She has recently returned home.   Back Pain  This is a chronic problem. The current episode started more than 1 year ago. The problem occurs constantly. The problem has been worse since onset. The pain is present in the lumbar spine. The quality of the pain is described as aching. The pain is moderate. The symptoms are aggravated by position. Pertinent negatives include no chest pain.   Anxiety  Presents for initial visit. Onset was 1 to 6 months ago. The problem is worse since onset. Symptoms include depressed mood, excessive worry, muscle tension, nervous/anxious behavior, panic, restlessness and malaise/fatigue.  Patient reports no chest pain, confusion, decreased concentration, dizziness, irritability or suicidal ideas. Symptoms occur most days. The severity of symptoms is interfering with daily activities. Nothing aggravates the symptoms. The quality of sleep is poor.       Objective   Vital Signs:  /63 (BP Location: Left arm, Patient Position: Sitting, Cuff Size: Adult)   Pulse 92   Temp 97.7 °F (36.5 °C) (Temporal)   Resp 18   Ht 157.5 cm (62\")   Wt 62.5 kg (137 lb 12.8 oz)   SpO2 97%   BMI 25.20 kg/m²   Estimated body mass index is 25.2 kg/m² as calculated from the following:    Height as of this encounter: 157.5 cm (62\").    Weight as of this encounter: 62.5 kg (137 lb 12.8 oz).            Physical Exam  Vitals and nursing note reviewed.   Constitutional:       General: She is not in acute distress.     Appearance: She is well-developed.   HENT:      Head: Normocephalic.   Eyes:      General: Lids are normal.      Conjunctiva/sclera: Conjunctivae normal.   Neck:      Thyroid: No thyroid mass or thyromegaly.      Trachea: Trachea normal.   Cardiovascular:      Rate " and Rhythm: Normal rate and regular rhythm.      Heart sounds: Normal heart sounds.   Pulmonary:      Effort: Pulmonary effort is normal.      Breath sounds: Rhonchi present.   Abdominal:      Palpations: Abdomen is soft.   Musculoskeletal:      Cervical back: Normal range of motion.      Lumbar back: Tenderness present.   Lymphadenopathy:      Cervical: No cervical adenopathy.   Skin:     General: Skin is warm and dry.   Neurological:      Mental Status: She is alert and oriented to person, place, and time.      Gait: Gait abnormal.   Psychiatric:         Attention and Perception: She is attentive.         Mood and Affect: Mood normal.         Speech: Speech normal.         Behavior: Behavior normal.        Result Review :  The following data was reviewed by: Bianca Reid MD on 01/31/2025:  Common labs          1/31/2025    11:29   Common Labs   Glucose 114    BUN 23    Creatinine 1.47    Sodium 141    Potassium 4.1    Chloride 103    Calcium 9.9    Albumin 3.8    Total Bilirubin 0.4    Alkaline Phosphatase 105    AST (SGOT) 19    ALT (SGPT) 9    Total Cholesterol 140    Triglycerides 50    HDL Cholesterol 53    LDL Cholesterol  76                Assessment and Plan   Diagnoses and all orders for this visit:    1. Compression fracture of L5 vertebra with routine healing, subsequent encounter (Primary)  Assessment & Plan:  Healing      2. Colitis    3. Bronchitis  Assessment & Plan:  Resolving      4. Chronic renal impairment, unspecified CKD stage  -     Comprehensive Metabolic Panel  -     Lipid Panel    5. Anxiety  Assessment & Plan:  Try low dose Xanax prn.    Orders:  -     ALPRAZolam (Xanax) 0.25 MG tablet; Take 1 tablet by mouth 2 (Two) Times a Day As Needed for Anxiety.  Dispense: 30 tablet; Refill: 0             Follow Up   No follow-ups on file.  Patient was given instructions and counseling regarding her condition or for health maintenance advice. Please see specific information pulled into the AVS  if appropriate.

## 2025-02-10 ENCOUNTER — TELEPHONE (OUTPATIENT)
Dept: FAMILY MEDICINE CLINIC | Facility: CLINIC | Age: OVER 89
End: 2025-02-10
Payer: MEDICARE

## 2025-02-10 NOTE — TELEPHONE ENCOUNTER
S/W LESLIE AND INFORMED HER WE CAN CLEAN JUANJOSE'S EARS IN OFFICE. TRANSFERRED CALL TO FRONT OFFICE TO SCHEDULE APPOINTMENT

## 2025-02-10 NOTE — TELEPHONE ENCOUNTER
Caller: LESLIE DOUGLAS    Relationship: Emergency Contact    Best call back number: [phone]    What was the call regarding: PATIENT'S HEARING CANNOT BE TESTED UNTIL HER EARS HAVE BEEN CLEANED. HEARING DOCTOR STATED THAT HER EARS HAVE EXCESSIVE WAX BUILD UP. PLEASE ADVISE IF DR. MARSH CAN CLEAN HER EARS OR IF PATIENT NEEDS TO BE REFERRED TO ENT.

## 2025-02-14 ENCOUNTER — OFFICE VISIT (OUTPATIENT)
Dept: FAMILY MEDICINE CLINIC | Facility: CLINIC | Age: OVER 89
End: 2025-02-14
Payer: MEDICARE

## 2025-02-14 VITALS
WEIGHT: 134.4 LBS | HEIGHT: 62 IN | OXYGEN SATURATION: 98 % | BODY MASS INDEX: 24.73 KG/M2 | TEMPERATURE: 97.7 F | RESPIRATION RATE: 15 BRPM | HEART RATE: 94 BPM | DIASTOLIC BLOOD PRESSURE: 79 MMHG | SYSTOLIC BLOOD PRESSURE: 132 MMHG

## 2025-02-14 DIAGNOSIS — H61.23 BILATERAL IMPACTED CERUMEN: Primary | ICD-10-CM

## 2025-02-14 NOTE — PROGRESS NOTES
"Chief Complaint  Cerumen Impaction (No complaints. Needs RT ear cleaned and flushed out )    Subjective        Ro Mejia presents to [facility]  History of Present Illness  She went to have a hearing aid follow up and was told that she had some wax in her ears that needed to be removed before any other things could be done for her hearing.   Ear Fullness   There is pain in both ears. This is a new problem. The problem occurs constantly. The problem has been unchanged. There has been no fever. The patient is experiencing no pain. Associated symptoms include hearing loss. Pertinent negatives include no coughing, drainage or headaches. She has tried nothing for the symptoms.       Objective   Vital Signs:  /79 (BP Location: Left arm, Patient Position: Sitting, Cuff Size: Adult)   Pulse 94   Temp 97.7 °F (36.5 °C) (Temporal)   Resp 15   Ht 157.5 cm (62.01\")   Wt 61 kg (134 lb 6.4 oz)   SpO2 98%   BMI 24.58 kg/m²   Estimated body mass index is 24.58 kg/m² as calculated from the following:    Height as of this encounter: 157.5 cm (62.01\").    Weight as of this encounter: 61 kg (134 lb 6.4 oz).    BMI is within normal parameters. No other follow-up for BMI required.      Physical Exam  Vitals and nursing note reviewed. Exam conducted with a chaperone present.   HENT:      Head: Normocephalic and atraumatic.      Right Ear: There is impacted cerumen.      Left Ear: There is impacted cerumen.   Cardiovascular:      Rate and Rhythm: Regular rhythm.      Heart sounds: Normal heart sounds.   Pulmonary:      Breath sounds: Normal breath sounds.   Musculoskeletal:      Cervical back: Neck supple.   Neurological:      Mental Status: She is alert.        Result Review :  The following data was reviewed by: Bianca Reid MD on 02/14/2025:  Common labs          1/31/2025    11:29   Common Labs   Glucose 114    BUN 23    Creatinine 1.47    Sodium 141    Potassium 4.1    Chloride " 103    Calcium 9.9    Albumin 3.8    Total Bilirubin 0.4    Alkaline Phosphatase 105    AST (SGOT) 19    ALT (SGPT) 9    Total Cholesterol 140    Triglycerides 50    HDL Cholesterol 53    LDL Cholesterol  76         Ear Cerumen Removal    Date/Time: 2/14/2025 9:35 AM    Performed by: Bianca Reid MD  Authorized by: Bianca Reid MD    Anesthesia:  Local Anesthetic: none  Ceruminolytics applied: Ceruminolytics applied prior to the procedure.  Location details: left ear and right ear  Patient tolerance: patient tolerated the procedure well with no immediate complications  Procedure type: irrigation   Sedation:  Patient sedated: no              Assessment and Plan   Diagnoses and all orders for this visit:    1. Bilateral impacted cerumen (Primary)  -     Ear Cerumen Removal             Follow Up   No follow-ups on file.  Patient was given instructions and counseling regarding her condition or for health maintenance advice. Please see specific information pulled into the AVS if appropriate.

## 2025-02-17 ENCOUNTER — DOCUMENTATION (OUTPATIENT)
Dept: PHYSICAL THERAPY | Facility: CLINIC | Age: OVER 89
End: 2025-02-17
Payer: MEDICARE

## 2025-02-17 NOTE — PROGRESS NOTES
Discharge Summary  Discharge Summary from Physical Therapy Report      Date of Initial PT visit: 10/17/2023  Number of Visits Seen: 10     Discharge Status of Patient:   At last visit: Pt demonstrates good progress with activation of DNF as well as scap stabilizers, however still continues to have pain with ROM of the neck as well as increased muscle guarding in the R side cervical spine. Pt has difficulty with rotation of cervical spine due to pain. Pt has some improvement in pain overall, however she does still have pain intermittently. Pt plans to see MD and then determine if she wants to continue PT.     Goals: Partially Met    Discharge Plan: Continue with current home exercise program as instructed  Future need for rehabilitation activities  Patient to return to referring/providing physician      Date of Discharge: 2/17/2025      Char Gentile, PT, DPT, OCS     [place] License # [id number]     [place] License # [id number]

## 2025-02-21 ENCOUNTER — OFFICE VISIT (OUTPATIENT)
Dept: FAMILY MEDICINE CLINIC | Facility: CLINIC | Age: OVER 89
End: 2025-02-21
Payer: MEDICARE

## 2025-02-21 VITALS
RESPIRATION RATE: 16 BRPM | TEMPERATURE: 98.1 F | HEART RATE: 87 BPM | WEIGHT: 136.2 LBS | SYSTOLIC BLOOD PRESSURE: 138 MMHG | HEIGHT: 62 IN | BODY MASS INDEX: 25.06 KG/M2 | DIASTOLIC BLOOD PRESSURE: 82 MMHG | OXYGEN SATURATION: 98 %

## 2025-02-21 DIAGNOSIS — H61.21 RIGHT EAR IMPACTED CERUMEN: Primary | ICD-10-CM

## 2025-02-21 NOTE — PROGRESS NOTES
"Chief Complaint  Ear Fullness (Right)    Subjective        Ro Mejia presents to [facility]  History of Present Illness  Patient had both ears lavaged last week with removal of ear wax.  Later that night she had some mild bleeding from the right ear and now she has muffled hearing again from the right ear and has been unable to use her hearing aid  Ear Fullness   There is pain in the right ear. This is a new problem. The current episode started in the past 7 days. The problem occurs constantly. The problem has been unchanged. There has been no fever. The pain is mild. Associated symptoms include hearing loss.       Objective   Vital Signs:  /82 (BP Location: Right arm, Patient Position: Sitting, Cuff Size: Adult)   Pulse 87   Temp 98.1 °F (36.7 °C) (Temporal)   Resp 16   Ht 157.5 cm (62.01\")   Wt 61.8 kg (136 lb 3.2 oz)   SpO2 98%   BMI 24.90 kg/m²   Estimated body mass index is 24.9 kg/m² as calculated from the following:    Height as of this encounter: 157.5 cm (62.01\").    Weight as of this encounter: 61.8 kg (136 lb 3.2 oz).    BMI is within normal parameters. No other follow-up for BMI required.      Physical Exam  Vitals and nursing note reviewed.   Constitutional:       General: She is not in acute distress.     Appearance: She is well-developed.   HENT:      Head: Normocephalic.      Ears:      Comments: Dried blood in right external ear canal, removed with lavage  Eyes:      General: Lids are normal.      Conjunctiva/sclera: Conjunctivae normal.   Neck:      Thyroid: No thyroid mass or thyromegaly.      Trachea: Trachea normal.   Cardiovascular:      Rate and Rhythm: Normal rate and regular rhythm.      Heart sounds: Normal heart sounds.   Pulmonary:      Effort: Pulmonary effort is normal.      Breath sounds: Normal breath sounds.   Abdominal:      Palpations: Abdomen is soft.   Musculoskeletal:      Cervical back: Normal range of motion. "   Lymphadenopathy:      Cervical: No cervical adenopathy.   Skin:     General: Skin is warm and dry.   Neurological:      Mental Status: She is alert and oriented to person, place, and time.   Psychiatric:         Attention and Perception: She is attentive.         Mood and Affect: Mood normal.         Speech: Speech normal.         Behavior: Behavior normal.        Result Review :  The following data was reviewed by: Bianca Reid MD on 02/21/2025:  Common labs          1/31/2025    11:29   Common Labs   Glucose 114    BUN 23    Creatinine 1.47    Sodium 141    Potassium 4.1    Chloride 103    Calcium 9.9    Albumin 3.8    Total Bilirubin 0.4    Alkaline Phosphatase 105    AST (SGOT) 19    ALT (SGPT) 9    Total Cholesterol 140    Triglycerides 50    HDL Cholesterol 53    LDL Cholesterol  76         Ear Cerumen Removal    Date/Time: 2/21/2025 2:16 PM    Performed by: Bianca Reid MD  Authorized by: Bianca Reid MD    Anesthesia:  Local Anesthetic: none  Location details: right ear  Patient tolerance: patient tolerated the procedure well with no immediate complications  Procedure type: irrigation   Sedation:  Patient sedated: no              Assessment and Plan   Diagnoses and all orders for this visit:    1. Right ear impacted cerumen (Primary)  -     Ear Cerumen Removal             Follow Up   No follow-ups on file.  Patient was given instructions and counseling regarding her condition or for health maintenance advice. Please see specific information pulled into the AVS if appropriate.

## 2025-02-21 NOTE — PROGRESS NOTES
Ear Cleaning Procedure    Right ear cleaning was performed to remove cerumen and/or debris from the ear canal.   The procedure was conducted using irrigation and curette.  There was little amount of dried blood.  The patient tolerated well without any complications.    02/21/25   Shady Jeffries MA

## 2025-02-27 ENCOUNTER — TELEPHONE (OUTPATIENT)
Dept: FAMILY MEDICINE CLINIC | Facility: CLINIC | Age: OVER 89
End: 2025-02-27
Payer: MEDICARE

## 2025-02-27 DIAGNOSIS — H61.23 BILATERAL IMPACTED CERUMEN: ICD-10-CM

## 2025-02-27 DIAGNOSIS — H61.21 RIGHT EAR IMPACTED CERUMEN: Primary | ICD-10-CM

## 2025-02-27 NOTE — TELEPHONE ENCOUNTER
Caller: Ro Mejia    Relationship: Self    Best call back number: [phone]     What is the medical concern/diagnosis: EARS FULL OF WAX AND BLEEDING AND ITS CONSISTENTLY HAPPENING    What specialty or service is being requested: ENT        Any additional details: WHEREVER YOU RECOMMEND IN THE AREA.

## 2025-03-05 ENCOUNTER — OUTSIDE FACILITY SERVICE (OUTPATIENT)
Dept: FAMILY MEDICINE CLINIC | Facility: CLINIC | Age: OVER 89
End: 2025-03-05
Payer: MEDICARE

## 2025-03-10 RX ORDER — RIVAROXABAN 15 MG/1
15 TABLET, FILM COATED ORAL
Qty: 90 TABLET | Refills: 3 | Status: SHIPPED | OUTPATIENT
Start: 2025-03-10

## 2025-03-24 ENCOUNTER — OFFICE VISIT (OUTPATIENT)
Dept: FAMILY MEDICINE CLINIC | Facility: CLINIC | Age: OVER 89
End: 2025-03-24
Payer: MEDICARE

## 2025-03-24 VITALS
DIASTOLIC BLOOD PRESSURE: 78 MMHG | SYSTOLIC BLOOD PRESSURE: 138 MMHG | WEIGHT: 137 LBS | TEMPERATURE: 97.9 F | BODY MASS INDEX: 25.21 KG/M2 | RESPIRATION RATE: 18 BRPM | HEART RATE: 71 BPM | HEIGHT: 62 IN | OXYGEN SATURATION: 93 %

## 2025-03-24 DIAGNOSIS — K52.9 COLITIS: Primary | ICD-10-CM

## 2025-03-24 PROCEDURE — G2211 COMPLEX E/M VISIT ADD ON: HCPCS | Performed by: FAMILY MEDICINE

## 2025-03-24 PROCEDURE — 99213 OFFICE O/P EST LOW 20 MIN: CPT | Performed by: FAMILY MEDICINE

## 2025-03-24 NOTE — PROGRESS NOTES
"Chief Complaint  Abdominal Pain (Onset in January/Pain 0/10)    Subjective        Ro Mejia presents to [facility]  Abdominal Pain  This is a new problem. The current episode started more than 1 month ago. The onset quality is undetermined. The problem occurs intermittently. The problem has been comes and goes. The pain is located in the suprapubic region, RLQ and LLQ. The pain is moderate. The quality of the pain is colicky. Pertinent negatives include no constipation, diarrhea, dysuria, fever, hematuria, melena, nausea or vomiting. The pain is aggravated by certain positions.       Objective   Vital Signs:  /78 (BP Location: Left arm, Patient Position: Sitting, Cuff Size: Adult)   Pulse 71   Temp 97.9 °F (36.6 °C) (Temporal)   Resp 18   Ht 157.5 cm (62\")   Wt 62.1 kg (137 lb)   SpO2 93%   BMI 25.06 kg/m²   Estimated body mass index is 25.06 kg/m² as calculated from the following:    Height as of this encounter: 157.5 cm (62\").    Weight as of this encounter: 62.1 kg (137 lb).            Physical Exam  Vitals and nursing note reviewed.   Constitutional:       General: She is not in acute distress.     Appearance: She is well-developed.   HENT:      Head: Normocephalic.   Eyes:      General: Lids are normal.      Conjunctiva/sclera: Conjunctivae normal.   Neck:      Thyroid: No thyroid mass or thyromegaly.      Trachea: Trachea normal.   Cardiovascular:      Rate and Rhythm: Normal rate and regular rhythm.      Heart sounds: Normal heart sounds.   Pulmonary:      Effort: Pulmonary effort is normal.      Breath sounds: Normal breath sounds.   Abdominal:      Palpations: Abdomen is soft.      Tenderness: There is abdominal tenderness in the suprapubic area.   Musculoskeletal:      Cervical back: Normal range of motion.   Lymphadenopathy:      Cervical: No cervical adenopathy.   Skin:     General: Skin is warm and dry.   Neurological:      Mental Status: She is alert " and oriented to person, place, and time.   Psychiatric:         Attention and Perception: She is attentive.         Mood and Affect: Mood normal.         Speech: Speech normal.         Behavior: Behavior normal.        Result Review :  The following data was reviewed by: Bianca Reid MD on 03/24/2025:  Common labs          1/31/2025    11:29   Common Labs   Glucose 114    BUN 23    Creatinine 1.47    Sodium 141    Potassium 4.1    Chloride 103    Calcium 9.9    Albumin 3.8    Total Bilirubin 0.4    Alkaline Phosphatase 105    AST (SGOT) 19    ALT (SGPT) 9    Total Cholesterol 140    Triglycerides 50    HDL Cholesterol 53    LDL Cholesterol  76                Assessment and Plan   Diagnoses and all orders for this visit:    1. Colitis (Primary)  -     amoxicillin-clavulanate (AUGMENTIN) 875-125 MG per tablet; Take 1 tablet by mouth 2 (Two) Times a Day.  Dispense: 20 tablet; Refill: 0             Follow Up   No follow-ups on file.  Patient was given instructions and counseling regarding her condition or for health maintenance advice. Please see specific information pulled into the AVS if appropriate.

## 2025-04-02 DIAGNOSIS — R06.02 SHORTNESS OF BREATH: ICD-10-CM

## 2025-04-02 RX ORDER — ALBUTEROL SULFATE 90 UG/1
2 INHALANT RESPIRATORY (INHALATION) EVERY 4 HOURS PRN
Qty: 8.5 G | Refills: 3 | Status: SHIPPED | OUTPATIENT
Start: 2025-04-02

## 2025-04-02 NOTE — TELEPHONE ENCOUNTER
Caller: LESLIE DOUGLAS    Relationship: Emergency Contact    Best call back number: [phone]     Requested Prescriptions:   Requested Prescriptions     Pending Prescriptions Disp Refills    albuterol sulfate  (90 Base) MCG/ACT inhaler 8.5 g 3     Si puffs Every 4 (Four) Hours As Needed. for wheezing        Pharmacy where request should be sent: [id number] [address] [facility] [address] [place] [address] & [facility] - [phone]  - [phone]      Last office visit with prescribing clinician: 3/24/2025   Last telemedicine visit with prescribing clinician: Visit date not found   Next office visit with prescribing clinician: 2025     Additional details provided by patient: PATIENTS DAUGHTER CALLED STATING THAT THE PHARMACY FILLED THE ALBUTEROL SOLUTION FOR A NEBULIZER AND NOT AN INHALER.    CAN THIS BE RESENT?    Does the patient have less than a 3 day supply:  [x] Yes  [] No    Neil Costa Rep   25 14:24 EDT

## 2025-06-02 RX ORDER — ATORVASTATIN CALCIUM 10 MG/1
10 TABLET, FILM COATED ORAL NIGHTLY
Qty: 90 TABLET | Refills: 3 | Status: SHIPPED | OUTPATIENT
Start: 2025-06-02

## 2025-06-09 ENCOUNTER — OFFICE VISIT (OUTPATIENT)
Dept: FAMILY MEDICINE CLINIC | Facility: CLINIC | Age: OVER 89
End: 2025-06-09
Payer: MEDICARE

## 2025-06-09 VITALS
TEMPERATURE: 98 F | DIASTOLIC BLOOD PRESSURE: 75 MMHG | WEIGHT: 140.4 LBS | SYSTOLIC BLOOD PRESSURE: 130 MMHG | BODY MASS INDEX: 25.83 KG/M2 | HEART RATE: 97 BPM | HEIGHT: 62 IN | OXYGEN SATURATION: 98 %

## 2025-06-09 DIAGNOSIS — Z78.0 POSTMENOPAUSE: ICD-10-CM

## 2025-06-09 DIAGNOSIS — Z00.00 MEDICARE ANNUAL WELLNESS VISIT, SUBSEQUENT: Primary | ICD-10-CM

## 2025-06-09 DIAGNOSIS — I10 ESSENTIAL HYPERTENSION: ICD-10-CM

## 2025-06-09 PROCEDURE — 1170F FXNL STATUS ASSESSED: CPT | Performed by: FAMILY MEDICINE

## 2025-06-09 PROCEDURE — 1160F RVW MEDS BY RX/DR IN RCRD: CPT | Performed by: FAMILY MEDICINE

## 2025-06-09 PROCEDURE — 1159F MED LIST DOCD IN RCRD: CPT | Performed by: FAMILY MEDICINE

## 2025-06-09 PROCEDURE — 1125F AMNT PAIN NOTED PAIN PRSNT: CPT | Performed by: FAMILY MEDICINE

## 2025-06-09 PROCEDURE — G0439 PPPS, SUBSEQ VISIT: HCPCS | Performed by: FAMILY MEDICINE

## 2025-06-09 RX ORDER — FUROSEMIDE 20 MG/1
TABLET ORAL
Qty: 48 TABLET | Refills: 3 | Status: SHIPPED | OUTPATIENT
Start: 2025-06-09

## 2025-06-09 RX ORDER — METOLAZONE 2.5 MG/1
2.5 TABLET ORAL DAILY
Qty: 30 TABLET | Refills: 5 | Status: SHIPPED | OUTPATIENT
Start: 2025-06-09 | End: 2025-06-16

## 2025-06-09 RX ORDER — LOSARTAN POTASSIUM 25 MG/1
25 TABLET ORAL DAILY
Qty: 90 TABLET | Refills: 3 | Status: SHIPPED | OUTPATIENT
Start: 2025-06-09

## 2025-06-09 NOTE — ASSESSMENT & PLAN NOTE
Hypertension is stable and controlled  Continue current treatment regimen.  Blood pressure will be reassessed in 6 months.    Orders:    losartan (COZAAR) 25 MG tablet; Take 1 tablet by mouth Daily.

## 2025-06-09 NOTE — PROGRESS NOTES
Subjective   The ABCs of the Annual Wellness Visit  Medicare Wellness Visit      Ro Mejia is a 89 y.o. patient who presents for a Medicare Wellness Visit.    The following portions of the patient's history were reviewed and   updated as appropriate: allergies, current medications, past family history, past medical history, past social history, past surgical history, and problem list.    Compared to one year ago, the patient's physical   health is the same.  Compared to one year ago, the patient's mental   health is the same.    Recent Hospitalizations:  She was admitted to the hospital during the last year at [place] for abdominal pain.     Current Medical Providers:  Patient Care Team:  Bianca Reid MD as PCP - General (Family Medicine)  Hermilo Haddad MD as Consulting Physician (Cardiology)  Denise Singh MD as Consulting Physician (Pulmonary Disease)  Latanya Caldwell OD (Optometry)    Outpatient Medications Prior to Visit   Medication Sig Dispense Refill    acetaminophen (TYLENOL) 325 MG tablet Take  by mouth.      albuterol sulfate  (90 Base) MCG/ACT inhaler Inhale 2 puffs Every 4 (Four) Hours As Needed for Wheezing or Shortness of Air. for wheezing 8.5 g 3    ALPRAZolam (Xanax) 0.25 MG tablet Take 1 tablet by mouth 2 (Two) Times a Day As Needed for Anxiety. 30 tablet 0    amoxicillin-clavulanate (AUGMENTIN) 875-125 MG per tablet Take 1 tablet by mouth 2 (Two) Times a Day. 20 tablet 0    atorvastatin (LIPITOR) 10 MG tablet TAKE 1 TABLET BY MOUTH EVERY NIGHT 90 tablet 3    dilTIAZem CD (CARDIZEM CD) 180 MG 24 hr capsule TAKE 1 CAPSULE BY MOUTH DAILY 90 capsule 3    furosemide (LASIX) 20 MG tablet TAKE 1 TABLET BY MOUTH 4 DAYS A WEEK 48 tablet 3    HYDROcodone-acetaminophen (NORCO) 5-325 MG per tablet Take 1 tablet by mouth Every 6 (Six) Hours As Needed for Moderate Pain.      ipratropium-albuterol (DUO-NEB) 0.5-2.5 mg/3 ml nebulizer Take 3 mL by nebulization Every 4 (Four) Hours As Needed  for Wheezing.      lidocaine (LIDODERM) 5 % Place 1 patch on the skin as directed by provider Daily. Remove & Discard patch within 12 hours or as directed by MD 30 patch 2    losartan (COZAAR) 25 MG tablet TAKE 1 TABLET BY MOUTH DAILY 90 tablet 3    Misc. Devices (Rollator Ultra-Light) misc 1 Device Daily. 1 each 0    rivaroxaban (Xarelto) 15 MG tablet TAKE 1 TABLET BY MOUTH EVERY DAY WITH DINNER 90 tablet 3    traMADol (ULTRAM) 50 MG tablet Take 1 tablet by mouth Every 12 (Twelve) Hours As Needed for Moderate Pain. 60 tablet 0     No facility-administered medications prior to visit.     Opioid medication/s are on active medication list.  and I have evaluated her active treatment plan and pain score trends (see table).  Vitals:    06/09/25 0922   PainSc: 6    PainLoc: Abdomen     I have reviewed the chart for potential of high risk medication and harmful drug interactions in the elderly.        Aspirin is not on active medication list.  Aspirin use is not indicated based on review of current medical condition/s. Risk of harm outweighs potential benefits.  .    Patient Active Problem List   Diagnosis    A-fib    Family history of diabetes mellitus    Arteriosclerotic cerebrovascular disease    Family history of stroke    Essential hypertension    Neoplasm of uncertain behavior of skin    Hyperlipidemia    Bilateral impacted cerumen    Bronchitis    Medicare annual wellness visit, subsequent    Acute left ankle pain    Neck pain on right side    DDD (degenerative disc disease), cervical    Dizziness    Closed fracture of left hip    Shortness of breath    Reactive depression    Anxiety    Chronic renal impairment    Colitis    Compression fracture of lumbar vertebra with routine healing     Advance Care Planning Advance Directive is not on file.  ACP discussion was held with the patient during this visit. Patient does not have an advance directive, information provided.            Objective   Vitals:    06/09/25 0922  "  BP: 130/75   BP Location: Left arm   Patient Position: Sitting   Cuff Size: Adult   Pulse: 97   Temp: 98 °F (36.7 °C)   TempSrc: Temporal   SpO2: 98%   Weight: 63.7 kg (140 lb 6.4 oz)   Height: 157.5 cm (62\")   PainSc: 6    PainLoc: Abdomen       Estimated body mass index is 25.68 kg/m² as calculated from the following:    Height as of this encounter: 157.5 cm (62\").    Weight as of this encounter: 63.7 kg (140 lb 6.4 oz).     Physical Exam  Vitals and nursing note reviewed.   Constitutional:       General: She is not in acute distress.     Appearance: She is well-developed.   HENT:      Head: Normocephalic.   Eyes:      General: Lids are normal.      Conjunctiva/sclera: Conjunctivae normal.   Neck:      Thyroid: No thyroid mass or thyromegaly.      Trachea: Trachea normal.   Cardiovascular:      Rate and Rhythm: Normal rate. Rhythm irregularly irregular.      Heart sounds: Normal heart sounds.   Pulmonary:      Effort: Pulmonary effort is normal.      Breath sounds: Normal breath sounds.   Abdominal:      Palpations: Abdomen is soft.   Musculoskeletal:      Cervical back: Normal range of motion.      Right lower leg: No edema.      Left lower leg: No edema.   Lymphadenopathy:      Cervical: No cervical adenopathy.   Skin:     General: Skin is warm and dry.   Neurological:      Mental Status: She is alert and oriented to person, place, and time.   Psychiatric:         Attention and Perception: She is attentive.         Mood and Affect: Mood normal.         Speech: Speech normal.         Behavior: Behavior normal.                  Does the patient have evidence of cognitive impairment? No                                                                                               Health  Risk Assessment    Smoking Status:  Social History     Tobacco Use   Smoking Status Never    Passive exposure: Never   Smokeless Tobacco Never     Alcohol Consumption:  Social History     Substance and Sexual Activity   Alcohol " Use Not Currently    Comment: caffeine occ.       Fall Risk Screen  BHAVESH Fall Risk Assessment was completed, and patient is at LOW risk for falls.Assessment completed on:2025    Depression Screening   Little interest or pleasure in doing things? Not at all   Feeling down, depressed, or hopeless? Not at all   PHQ-2 Total Score 0      Health Habits and Functional and Cognitive Screenin/9/2025     9:20 AM   Functional & Cognitive Status   Do you have difficulty preparing food and eating? No   Do you have difficulty bathing yourself, getting dressed or grooming yourself? No   Do you have difficulty using the toilet? No   Do you have difficulty moving around from place to place? No   Do you have trouble with steps or getting out of a bed or a chair? Yes   Current Diet Well Balanced Diet   Dental Exam Not up to date   Eye Exam Up to date   Exercise (times per week) 7 times per week   Current Exercises Include Walking   Do you need help using the phone?  No   Are you deaf or do you have serious difficulty hearing?  No   Do you need help to go to places out of walking distance? No   Do you need help shopping? No   Do you need help preparing meals?  No   Do you need help with housework?  Yes   Do you need help with laundry? No   Do you need help taking your medications? No   Do you need help managing money? No   Do you ever drive or ride in a car without wearing a seat belt? No   Have you felt unusual stress, anger or loneliness in the last month? No   Who do you live with? Child   If you need help, do you have trouble finding someone available to you? No   Have you been bothered in the last four weeks by sexual problems? No   Do you have difficulty concentrating, remembering or making decisions? No           Age-appropriate Screening Schedule:  Refer to the list below for future screening recommendations based on patient's age, sex and/or medical conditions. Orders for these recommended tests are listed in  the plan section. The patient has been provided with a written plan.    Health Maintenance List  Health Maintenance   Topic Date Due    DXA SCAN  Never done    ANNUAL WELLNESS VISIT  01/15/2025    COVID-19 Vaccine (6 - 2024-25 season) 12/31/2025 (Originally 9/1/2024)    RSV Vaccine - Adults (1 - 1-dose 75+ series) 12/31/2025 (Originally 1/3/2006)    ZOSTER VACCINE (1 of 2) 12/31/2025 (Originally 1/3/1981)    INFLUENZA VACCINE  07/01/2025    LIPID PANEL  01/31/2026    TDAP/TD VACCINES (2 - Td or Tdap) 02/19/2033    Pneumococcal Vaccine 50+  Completed                                                                                                                                                CMS Preventative Services Quick Reference  Risk Factors Identified During Encounter  Inactivity/Sedentary: Patient was advised to exercise at least 150 minutes a week per CDC recommendations.  Dental Screening Recommended  Vision Screening Recommended    The above risks/problems have been discussed with the patient.  Pertinent information has been shared with the patient in the After Visit Summary.  An After Visit Summary and PPPS were made available to the patient.    Follow Up:   Next Medicare Wellness visit to be scheduled in 1 year.     Assessment & Plan  Medicare annual wellness visit, subsequent         Postmenopause    Orders:    DEXA Bone Density Axial; Future    Essential hypertension  Hypertension is stable and controlled  Continue current treatment regimen.  Blood pressure will be reassessed in 6 months.    Orders:    losartan (COZAAR) 25 MG tablet; Take 1 tablet by mouth Daily.         Follow Up:   No follow-ups on file.

## 2025-06-16 RX ORDER — METOLAZONE 2.5 MG/1
TABLET ORAL SEE ADMIN INSTRUCTIONS
Qty: 36 TABLET | Refills: 3 | Status: SHIPPED | OUTPATIENT
Start: 2025-06-16